# Patient Record
Sex: FEMALE | Race: WHITE | NOT HISPANIC OR LATINO
[De-identification: names, ages, dates, MRNs, and addresses within clinical notes are randomized per-mention and may not be internally consistent; named-entity substitution may affect disease eponyms.]

---

## 2019-11-18 ENCOUNTER — HOSPITAL (OUTPATIENT)
Dept: OTHER | Age: 79
End: 2019-11-18

## 2019-11-18 ENCOUNTER — DIAGNOSTIC TRANS (OUTPATIENT)
Dept: OTHER | Age: 79
End: 2019-11-18

## 2019-11-18 LAB
ALBUMIN SERPL-MCNC: 3.8 G/DL (ref 3.6–5.1)
ALBUMIN/GLOB SERPL: 0.9 {RATIO} (ref 1–2.4)
ALP SERPL-CCNC: 74 UNITS/L (ref 45–117)
ALT SERPL-CCNC: 15 UNITS/L
AMORPH SED URNS QL MICRO: ABNORMAL
ANALYZER ANC (IANC): ABNORMAL
ANION GAP SERPL CALC-SCNC: 16 MMOL/L (ref 10–20)
APPEARANCE UR: CLEAR
AST SERPL-CCNC: 16 UNITS/L
BACTERIA #/AREA URNS HPF: ABNORMAL /HPF
BASOPHILS # BLD: 0.1 K/MCL (ref 0–0.3)
BASOPHILS NFR BLD: 0 %
BILIRUB SERPL-MCNC: 0.5 MG/DL (ref 0.2–1)
BILIRUB UR QL: NEGATIVE
BUN SERPL-MCNC: 14 MG/DL (ref 6–20)
BUN/CREAT SERPL: 26 (ref 7–25)
CALCIUM SERPL-MCNC: 9.7 MG/DL (ref 8.4–10.2)
CAOX CRY URNS QL MICRO: ABNORMAL
CHLORIDE SERPL-SCNC: 105 MMOL/L (ref 98–107)
CO2 SERPL-SCNC: 20 MMOL/L (ref 21–32)
COLOR UR: YELLOW
CREAT SERPL-MCNC: 0.54 MG/DL (ref 0.51–0.95)
DIFFERENTIAL METHOD BLD: ABNORMAL
EOSINOPHIL # BLD: 0 K/MCL (ref 0.1–0.5)
EOSINOPHIL NFR BLD: 0 %
EPITH CASTS #/AREA URNS LPF: ABNORMAL /[LPF]
ERYTHROCYTE [DISTWIDTH] IN BLOOD: 19 % (ref 11–15)
FATTY CASTS #/AREA URNS LPF: ABNORMAL /[LPF]
GLOBULIN SER-MCNC: 4.2 G/DL (ref 2–4)
GLUCOSE BLDC GLUCOMTR-MCNC: 156 MG/DL (ref 70–99)
GLUCOSE BLDC GLUCOMTR-MCNC: 159 MG/DL (ref 70–99)
GLUCOSE SERPL-MCNC: 198 MG/DL (ref 65–99)
GLUCOSE UR-MCNC: >500 MG/DL
GRAN CASTS #/AREA URNS LPF: ABNORMAL /[LPF]
HCT VFR BLD CALC: 47 % (ref 36–46.5)
HGB BLD-MCNC: 15.1 G/DL (ref 12–15.5)
HGB UR QL: NEGATIVE
HYALINE CASTS #/AREA URNS LPF: ABNORMAL /LPF (ref 0–5)
IMM GRANULOCYTES # BLD AUTO: 0.1 K/MCL (ref 0–0.2)
IMM GRANULOCYTES NFR BLD: 1 %
KETONES UR-MCNC: 80 MG/DL
LACTATE BLDV-MCNC: 1.5 MMOL/L
LEUKOCYTE ESTERASE UR QL STRIP: NEGATIVE
LIPASE SERPL-CCNC: <50 UNITS/L (ref 73–393)
LYMPHOCYTES # BLD: 1.4 K/MCL (ref 1–4)
LYMPHOCYTES NFR BLD: 9 %
MCH RBC QN AUTO: 25 PG (ref 26–34)
MCHC RBC AUTO-ENTMCNC: 32.1 G/DL (ref 32–36.5)
MCV RBC AUTO: 77.7 FL (ref 78–100)
MIXED CELL CASTS #/AREA URNS LPF: ABNORMAL /[LPF]
MONOCYTES # BLD: 0.9 K/MCL (ref 0.3–0.9)
MONOCYTES NFR BLD: 6 %
MUCOUS THREADS URNS QL MICRO: ABNORMAL
NEUTROPHILS # BLD: 13 K/MCL (ref 1.8–7.7)
NEUTROPHILS NFR BLD: 84 %
NEUTS SEG NFR BLD: ABNORMAL %
NITRITE UR QL: NEGATIVE
NRBC (NRBCRE): 0 /100 WBC
PH UR: 5 UNITS (ref 5–7)
PLATELET # BLD: 375 K/MCL (ref 140–450)
POTASSIUM SERPL-SCNC: 3.5 MMOL/L (ref 3.4–5.1)
PROT SERPL-MCNC: 8 G/DL (ref 6.4–8.2)
PROT UR QL: NEGATIVE MG/DL
RBC # BLD: 6.05 MIL/MCL (ref 4–5.2)
RBC #/AREA URNS HPF: ABNORMAL /HPF (ref 0–2)
RBC CASTS #/AREA URNS LPF: ABNORMAL /[LPF]
RENAL EPI CELLS #/AREA URNS HPF: ABNORMAL /[HPF]
SODIUM SERPL-SCNC: 138 MMOL/L (ref 135–145)
SP GR UR: >1.03 (ref 1–1.03)
SPECIMEN SOURCE: ABNORMAL
SPECIMEN SOURCE: ABNORMAL
SPERM URNS QL MICRO: ABNORMAL
SQUAMOUS #/AREA URNS HPF: ABNORMAL /HPF (ref 0–5)
T VAGINALIS URNS QL MICRO: ABNORMAL
TRI-PHOS CRY URNS QL MICRO: ABNORMAL
TROPONIN I SERPL HS-MCNC: <0.02 NG/ML
URATE CRY URNS QL MICRO: ABNORMAL
URINE REFLEX: ABNORMAL
URNS CMNT MICRO: ABNORMAL
UROBILINOGEN UR QL: 0.2 MG/DL (ref 0–1)
WAXY CASTS #/AREA URNS LPF: ABNORMAL /[LPF]
WBC # BLD: 15.4 K/MCL (ref 4.2–11)
WBC #/AREA URNS HPF: ABNORMAL /HPF (ref 0–5)
WBC CASTS #/AREA URNS LPF: ABNORMAL /[LPF]
YEAST HYPHAE URNS QL MICRO: ABNORMAL
YEAST URNS QL MICRO: ABNORMAL

## 2019-11-18 PROCEDURE — 99223 1ST HOSP IP/OBS HIGH 75: CPT

## 2019-11-18 PROCEDURE — X1094 NO CHARGE VISIT: HCPCS | Performed by: SPECIALIST/TECHNOLOGIST, OTHER

## 2019-11-18 PROCEDURE — 49566 REPAIR RECURR INCIS HERNIA,STRANG: CPT

## 2019-11-18 PROCEDURE — 49568 HERNIA MESH IMPLANT VENT/INCIS FOR OPEN: CPT

## 2019-11-19 LAB
ALBUMIN SERPL-MCNC: 2.8 G/DL (ref 3.6–5.1)
ALBUMIN/GLOB SERPL: 0.8 {RATIO} (ref 1–2.4)
ALP SERPL-CCNC: 50 UNITS/L (ref 45–117)
ALT SERPL-CCNC: 8 UNITS/L
ANALYZER ANC (IANC): ABNORMAL
ANION GAP SERPL CALC-SCNC: 11 MMOL/L (ref 10–20)
AST SERPL-CCNC: 13 UNITS/L
BASOPHILS # BLD: 0 K/MCL (ref 0–0.3)
BASOPHILS NFR BLD: 0 %
BILIRUB SERPL-MCNC: 0.5 MG/DL (ref 0.2–1)
BUN SERPL-MCNC: 16 MG/DL (ref 6–20)
BUN/CREAT SERPL: 26 (ref 7–25)
CALCIUM SERPL-MCNC: 8.1 MG/DL (ref 8.4–10.2)
CHLORIDE SERPL-SCNC: 107 MMOL/L (ref 98–107)
CO2 SERPL-SCNC: 25 MMOL/L (ref 21–32)
CREAT SERPL-MCNC: 0.62 MG/DL (ref 0.51–0.95)
DIFFERENTIAL METHOD BLD: ABNORMAL
EOSINOPHIL # BLD: 0 K/MCL (ref 0.1–0.5)
EOSINOPHIL NFR BLD: 0 %
ERYTHROCYTE [DISTWIDTH] IN BLOOD: 18.1 % (ref 11–15)
GLOBULIN SER-MCNC: 3.4 G/DL (ref 2–4)
GLUCOSE BLDC GLUCOMTR-MCNC: 188 MG/DL (ref 70–99)
GLUCOSE BLDC GLUCOMTR-MCNC: 195 MG/DL (ref 70–99)
GLUCOSE BLDC GLUCOMTR-MCNC: 195 MG/DL (ref 70–99)
GLUCOSE BLDC GLUCOMTR-MCNC: 206 MG/DL (ref 70–99)
GLUCOSE BLDC GLUCOMTR-MCNC: ABNORMAL MG/DL
GLUCOSE SERPL-MCNC: 209 MG/DL (ref 65–99)
HCT VFR BLD CALC: 41.3 % (ref 36–46.5)
HGB BLD-MCNC: 13 G/DL (ref 12–15.5)
IMM GRANULOCYTES # BLD AUTO: 0.1 K/MCL (ref 0–0.2)
IMM GRANULOCYTES NFR BLD: 0 %
LYMPHOCYTES # BLD: 2 K/MCL (ref 1–4)
LYMPHOCYTES NFR BLD: 15 %
MCH RBC QN AUTO: 24.6 PG (ref 26–34)
MCHC RBC AUTO-ENTMCNC: 31.5 G/DL (ref 32–36.5)
MCV RBC AUTO: 78.1 FL (ref 78–100)
MONOCYTES # BLD: 2.9 K/MCL (ref 0.3–0.9)
MONOCYTES NFR BLD: 21 %
NEUTROPHILS # BLD: 8.7 K/MCL (ref 1.8–7.7)
NEUTROPHILS NFR BLD: 64 %
NEUTS SEG NFR BLD: ABNORMAL %
NRBC (NRBCRE): 0 /100 WBC
PLATELET # BLD: 334 K/MCL (ref 140–450)
POTASSIUM SERPL-SCNC: 3.3 MMOL/L (ref 3.4–5.1)
PROT SERPL-MCNC: 6.2 G/DL (ref 6.4–8.2)
RBC # BLD: 5.29 MIL/MCL (ref 4–5.2)
SODIUM SERPL-SCNC: 140 MMOL/L (ref 135–145)
WBC # BLD: 13.8 K/MCL (ref 4.2–11)

## 2019-11-19 PROCEDURE — 99024 POSTOP FOLLOW-UP VISIT: CPT | Performed by: NURSE PRACTITIONER

## 2019-11-20 LAB
ANALYZER ANC (IANC): ABNORMAL
ANION GAP SERPL CALC-SCNC: 11 MMOL/L (ref 10–20)
BUN SERPL-MCNC: 15 MG/DL (ref 6–20)
BUN/CREAT SERPL: 28 (ref 7–25)
CALCIUM SERPL-MCNC: 8.2 MG/DL (ref 8.4–10.2)
CHLORIDE SERPL-SCNC: 106 MMOL/L (ref 98–107)
CO2 SERPL-SCNC: 25 MMOL/L (ref 21–32)
CREAT SERPL-MCNC: 0.51 MG/DL (ref 0.51–0.95)
CREAT SERPL-MCNC: 0.54 MG/DL (ref 0.51–0.95)
ERYTHROCYTE [DISTWIDTH] IN BLOOD: 17.3 % (ref 11–15)
GLUCOSE BLDC GLUCOMTR-MCNC: 131 MG/DL (ref 70–99)
GLUCOSE BLDC GLUCOMTR-MCNC: 158 MG/DL (ref 70–99)
GLUCOSE BLDC GLUCOMTR-MCNC: 165 MG/DL (ref 70–99)
GLUCOSE BLDC GLUCOMTR-MCNC: 165 MG/DL (ref 70–99)
GLUCOSE BLDC GLUCOMTR-MCNC: 171 MG/DL (ref 70–99)
GLUCOSE BLDC GLUCOMTR-MCNC: ABNORMAL MG/DL
GLUCOSE SERPL-MCNC: 181 MG/DL (ref 65–99)
HCT VFR BLD CALC: 37.8 % (ref 36–46.5)
HGB BLD-MCNC: 12.1 G/DL (ref 12–15.5)
MCH RBC QN AUTO: 25.5 PG (ref 26–34)
MCHC RBC AUTO-ENTMCNC: 32 G/DL (ref 32–36.5)
MCV RBC AUTO: 79.6 FL (ref 78–100)
NRBC (NRBCRE): 0 /100 WBC
PLATELET # BLD: 323 K/MCL (ref 140–450)
POTASSIUM SERPL-SCNC: 4.1 MMOL/L (ref 3.4–5.1)
POTASSIUM SERPL-SCNC: 4.1 MMOL/L (ref 3.4–5.1)
POTASSIUM SERPL-SCNC: ABNORMAL MMOL/L
RBC # BLD: 4.75 MIL/MCL (ref 4–5.2)
SODIUM SERPL-SCNC: 138 MMOL/L (ref 135–145)
WBC # BLD: 12.8 K/MCL (ref 4.2–11)

## 2019-11-20 PROCEDURE — 99024 POSTOP FOLLOW-UP VISIT: CPT

## 2019-11-21 LAB
ANALYZER ANC (IANC): ABNORMAL
BASOPHILS # BLD: 0 K/MCL (ref 0–0.3)
BASOPHILS NFR BLD: 0 %
CREAT SERPL-MCNC: 0.52 MG/DL (ref 0.51–0.95)
DIFFERENTIAL METHOD BLD: ABNORMAL
EOSINOPHIL # BLD: 0.5 K/MCL (ref 0.1–0.5)
EOSINOPHIL NFR BLD: 4 %
ERYTHROCYTE [DISTWIDTH] IN BLOOD: 17.2 % (ref 11–15)
GLUCOSE BLDC GLUCOMTR-MCNC: 146 MG/DL (ref 70–99)
GLUCOSE BLDC GLUCOMTR-MCNC: 165 MG/DL (ref 70–99)
GLUCOSE BLDC GLUCOMTR-MCNC: 168 MG/DL (ref 70–99)
GLUCOSE BLDC GLUCOMTR-MCNC: 174 MG/DL (ref 70–99)
GLUCOSE BLDC GLUCOMTR-MCNC: 256 MG/DL (ref 70–99)
GLUCOSE BLDC GLUCOMTR-MCNC: ABNORMAL MG/DL
HCT VFR BLD CALC: 38.7 % (ref 36–46.5)
HGB BLD-MCNC: 12.4 G/DL (ref 12–15.5)
IMM GRANULOCYTES # BLD AUTO: 0.1 K/MCL (ref 0–0.2)
IMM GRANULOCYTES NFR BLD: 1 %
LYMPHOCYTES # BLD: 3 K/MCL (ref 1–4)
LYMPHOCYTES NFR BLD: 25 %
MAGNESIUM SERPL-MCNC: 1.8 MG/DL (ref 1.7–2.4)
MCH RBC QN AUTO: 24.7 PG (ref 26–34)
MCHC RBC AUTO-ENTMCNC: 32 G/DL (ref 32–36.5)
MCV RBC AUTO: 76.9 FL (ref 78–100)
MONOCYTES # BLD: 1.8 K/MCL (ref 0.3–0.9)
MONOCYTES NFR BLD: 15 %
NEUTROPHILS # BLD: 6.7 K/MCL (ref 1.8–7.7)
NEUTROPHILS NFR BLD: 55 %
NEUTS SEG NFR BLD: ABNORMAL %
NRBC (NRBCRE): 0 /100 WBC
PLATELET # BLD: 347 K/MCL (ref 140–450)
POTASSIUM SERPL-SCNC: 3.5 MMOL/L (ref 3.4–5.1)
RBC # BLD: 5.03 MIL/MCL (ref 4–5.2)
WBC # BLD: 12.1 K/MCL (ref 4.2–11)

## 2019-11-21 PROCEDURE — 99024 POSTOP FOLLOW-UP VISIT: CPT | Performed by: NURSE PRACTITIONER

## 2019-11-22 ENCOUNTER — DIAGNOSTIC TRANS (OUTPATIENT)
Dept: OTHER | Age: 79
End: 2019-11-22

## 2019-11-22 LAB
ANALYZER ANC (IANC): ABNORMAL
BASOPHILS # BLD: 0.1 K/MCL (ref 0–0.3)
BASOPHILS NFR BLD: 1 %
CREAT SERPL-MCNC: 0.49 MG/DL (ref 0.51–0.95)
DIFFERENTIAL METHOD BLD: ABNORMAL
EOSINOPHIL # BLD: 0.6 K/MCL (ref 0.1–0.5)
EOSINOPHIL NFR BLD: 6 %
ERYTHROCYTE [DISTWIDTH] IN BLOOD: 17.1 % (ref 11–15)
GLUCOSE BLDC GLUCOMTR-MCNC: 177 MG/DL (ref 70–99)
GLUCOSE BLDC GLUCOMTR-MCNC: 216 MG/DL (ref 70–99)
GLUCOSE BLDC GLUCOMTR-MCNC: 250 MG/DL (ref 70–99)
GLUCOSE BLDC GLUCOMTR-MCNC: 254 MG/DL (ref 70–99)
GLUCOSE BLDC GLUCOMTR-MCNC: 312 MG/DL (ref 70–99)
GLUCOSE BLDC GLUCOMTR-MCNC: ABNORMAL MG/DL
HCT VFR BLD CALC: 36.5 % (ref 36–46.5)
HGB BLD-MCNC: 11.9 G/DL (ref 12–15.5)
IMM GRANULOCYTES # BLD AUTO: 0.1 K/MCL (ref 0–0.2)
IMM GRANULOCYTES NFR BLD: 1 %
LACTATE BLDV-SCNC: 1 MMOL/L (ref 0–2)
LYMPHOCYTES # BLD: 3.5 K/MCL (ref 1–4)
LYMPHOCYTES NFR BLD: 31 %
MCH RBC QN AUTO: 25.3 PG (ref 26–34)
MCHC RBC AUTO-ENTMCNC: 32.6 G/DL (ref 32–36.5)
MCV RBC AUTO: 77.5 FL (ref 78–100)
MONOCYTES # BLD: 1.9 K/MCL (ref 0.3–0.9)
MONOCYTES NFR BLD: 17 %
NEUTROPHILS # BLD: 4.9 K/MCL (ref 1.8–7.7)
NEUTROPHILS NFR BLD: 44 %
NEUTS SEG NFR BLD: ABNORMAL %
NRBC (NRBCRE): 0 /100 WBC
PLATELET # BLD: 351 K/MCL (ref 140–450)
POTASSIUM SERPL-SCNC: 3.9 MMOL/L (ref 3.4–5.1)
RBC # BLD: 4.71 MIL/MCL (ref 4–5.2)
WBC # BLD: 11.1 K/MCL (ref 4.2–11)

## 2019-11-22 PROCEDURE — 99024 POSTOP FOLLOW-UP VISIT: CPT | Performed by: SURGERY

## 2019-11-22 PROCEDURE — 93306 TTE W/DOPPLER COMPLETE: CPT | Performed by: INTERNAL MEDICINE

## 2019-11-23 LAB
ALBUMIN SERPL-MCNC: 2.4 G/DL (ref 3.6–5.1)
ALBUMIN/GLOB SERPL: 0.6 {RATIO} (ref 1–2.4)
ALP SERPL-CCNC: 71 UNITS/L (ref 45–117)
ALT SERPL-CCNC: 15 UNITS/L
ANALYZER ANC (IANC): ABNORMAL
ANION GAP SERPL CALC-SCNC: 7 MMOL/L (ref 10–20)
AST SERPL-CCNC: 14 UNITS/L
BASOPHILS # BLD: 0.1 K/MCL (ref 0–0.3)
BASOPHILS NFR BLD: 1 %
BILIRUB SERPL-MCNC: 0.4 MG/DL (ref 0.2–1)
BUN SERPL-MCNC: 12 MG/DL (ref 6–20)
BUN/CREAT SERPL: 24 (ref 7–25)
CALCIUM SERPL-MCNC: 9 MG/DL (ref 8.4–10.2)
CHLORIDE SERPL-SCNC: 101 MMOL/L (ref 98–107)
CO2 SERPL-SCNC: 35 MMOL/L (ref 21–32)
CREAT SERPL-MCNC: 0.5 MG/DL (ref 0.51–0.95)
DIFFERENTIAL METHOD BLD: ABNORMAL
EOSINOPHIL # BLD: 0.6 K/MCL (ref 0.1–0.5)
EOSINOPHIL NFR BLD: 6 %
ERYTHROCYTE [DISTWIDTH] IN BLOOD: 16.8 % (ref 11–15)
GLOBULIN SER-MCNC: 3.7 G/DL (ref 2–4)
GLUCOSE BLDC GLUCOMTR-MCNC: 222 MG/DL (ref 70–99)
GLUCOSE BLDC GLUCOMTR-MCNC: 265 MG/DL (ref 70–99)
GLUCOSE BLDC GLUCOMTR-MCNC: 298 MG/DL (ref 70–99)
GLUCOSE BLDC GLUCOMTR-MCNC: 319 MG/DL (ref 70–99)
GLUCOSE BLDC GLUCOMTR-MCNC: 369 MG/DL (ref 70–99)
GLUCOSE BLDC GLUCOMTR-MCNC: ABNORMAL MG/DL
GLUCOSE SERPL-MCNC: 261 MG/DL (ref 65–99)
HCT VFR BLD CALC: 38.7 % (ref 36–46.5)
HGB BLD-MCNC: 12.4 G/DL (ref 12–15.5)
IMM GRANULOCYTES # BLD AUTO: 0.1 K/MCL (ref 0–0.2)
IMM GRANULOCYTES NFR BLD: 1 %
LYMPHOCYTES # BLD: 2.5 K/MCL (ref 1–4)
LYMPHOCYTES NFR BLD: 27 %
MCH RBC QN AUTO: 25.3 PG (ref 26–34)
MCHC RBC AUTO-ENTMCNC: 32 G/DL (ref 32–36.5)
MCV RBC AUTO: 78.8 FL (ref 78–100)
MONOCYTES # BLD: 1.8 K/MCL (ref 0.3–0.9)
MONOCYTES NFR BLD: 20 %
NEUTROPHILS # BLD: 4.2 K/MCL (ref 1.8–7.7)
NEUTROPHILS NFR BLD: 45 %
NEUTS SEG NFR BLD: ABNORMAL %
NRBC (NRBCRE): 0 /100 WBC
PLATELET # BLD: 345 K/MCL (ref 140–450)
POTASSIUM SERPL-SCNC: 3.6 MMOL/L (ref 3.4–5.1)
PROT SERPL-MCNC: 6.1 G/DL (ref 6.4–8.2)
RBC # BLD: 4.91 MIL/MCL (ref 4–5.2)
SODIUM SERPL-SCNC: 139 MMOL/L (ref 135–145)
WBC # BLD: 9.2 K/MCL (ref 4.2–11)

## 2019-11-24 LAB
ANALYZER ANC (IANC): ABNORMAL
ANION GAP SERPL CALC-SCNC: 8 MMOL/L (ref 10–20)
BASOPHILS # BLD: 0.1 K/MCL (ref 0–0.3)
BASOPHILS NFR BLD: 1 %
BUN SERPL-MCNC: 12 MG/DL (ref 6–20)
BUN/CREAT SERPL: 25 (ref 7–25)
CALCIUM SERPL-MCNC: 8.6 MG/DL (ref 8.4–10.2)
CHLORIDE SERPL-SCNC: 98 MMOL/L (ref 98–107)
CO2 SERPL-SCNC: 34 MMOL/L (ref 21–32)
CREAT SERPL-MCNC: 0.47 MG/DL (ref 0.51–0.95)
DIFFERENTIAL METHOD BLD: ABNORMAL
EOSINOPHIL # BLD: 0.4 K/MCL (ref 0.1–0.5)
EOSINOPHIL NFR BLD: 4 %
ERYTHROCYTE [DISTWIDTH] IN BLOOD: 16.2 % (ref 11–15)
GLUCOSE BLDC GLUCOMTR-MCNC: 204 MG/DL (ref 70–99)
GLUCOSE BLDC GLUCOMTR-MCNC: 231 MG/DL (ref 70–99)
GLUCOSE BLDC GLUCOMTR-MCNC: 238 MG/DL (ref 70–99)
GLUCOSE BLDC GLUCOMTR-MCNC: 302 MG/DL (ref 70–99)
GLUCOSE BLDC GLUCOMTR-MCNC: ABNORMAL MG/DL
GLUCOSE SERPL-MCNC: 235 MG/DL (ref 65–99)
HCT VFR BLD CALC: 37.7 % (ref 36–46.5)
HGB BLD-MCNC: 12.2 G/DL (ref 12–15.5)
IMM GRANULOCYTES # BLD AUTO: 0.1 K/MCL (ref 0–0.2)
IMM GRANULOCYTES NFR BLD: 1 %
LYMPHOCYTES # BLD: 3.4 K/MCL (ref 1–4)
LYMPHOCYTES NFR BLD: 31 %
MCH RBC QN AUTO: 25.4 PG (ref 26–34)
MCHC RBC AUTO-ENTMCNC: 32.4 G/DL (ref 32–36.5)
MCV RBC AUTO: 78.4 FL (ref 78–100)
MONOCYTES # BLD: 2 K/MCL (ref 0.3–0.9)
MONOCYTES NFR BLD: 19 %
NEUTROPHILS # BLD: 4.9 K/MCL (ref 1.8–7.7)
NEUTROPHILS NFR BLD: 44 %
NEUTS SEG NFR BLD: ABNORMAL %
NRBC (NRBCRE): 0 /100 WBC
PLATELET # BLD: 385 K/MCL (ref 140–450)
POTASSIUM SERPL-SCNC: 3.2 MMOL/L (ref 3.4–5.1)
RBC # BLD: 4.81 MIL/MCL (ref 4–5.2)
SODIUM SERPL-SCNC: 137 MMOL/L (ref 135–145)
WBC # BLD: 10.9 K/MCL (ref 4.2–11)

## 2019-11-26 LAB
PATHOLOGIST NAME: NORMAL
PATHOLOGIST NAME: NORMAL

## 2019-11-27 LAB
BACTERIA BLD CULT: NORMAL

## 2019-12-03 ENCOUNTER — OFFICE VISIT (OUTPATIENT)
Dept: SURGERY | Age: 79
End: 2019-12-03

## 2019-12-03 VITALS
DIASTOLIC BLOOD PRESSURE: 77 MMHG | WEIGHT: 216 LBS | SYSTOLIC BLOOD PRESSURE: 130 MMHG | HEIGHT: 65 IN | HEART RATE: 78 BPM | BODY MASS INDEX: 35.99 KG/M2

## 2019-12-03 DIAGNOSIS — Z98.890 S/P EXPLORATORY LAPAROTOMY: Primary | ICD-10-CM

## 2019-12-03 PROBLEM — K43.6 VENTRAL HERNIA WITH BOWEL OBSTRUCTION: Status: ACTIVE | Noted: 2019-11-18

## 2019-12-03 PROCEDURE — 99024 POSTOP FOLLOW-UP VISIT: CPT

## 2019-12-03 RX ORDER — SIMVASTATIN 40 MG
TABLET ORAL
Refills: 1 | COMMUNITY
Start: 2019-11-05

## 2019-12-03 RX ORDER — ENOXAPARIN SODIUM 100 MG/ML
40 INJECTION SUBCUTANEOUS
COMMUNITY

## 2019-12-03 RX ORDER — PANTOPRAZOLE SODIUM 40 MG/1
40 TABLET, DELAYED RELEASE ORAL
COMMUNITY

## 2019-12-03 RX ORDER — INSULIN DEGLUDEC 200 U/ML
INJECTION, SOLUTION SUBCUTANEOUS
Refills: 3 | COMMUNITY
Start: 2019-09-11

## 2019-12-03 RX ORDER — ASPIRIN 81 MG/1
81 TABLET, CHEWABLE ORAL
COMMUNITY

## 2019-12-03 RX ORDER — LOSARTAN POTASSIUM 100 MG/1
100 TABLET ORAL
COMMUNITY

## 2019-12-03 RX ORDER — SITAGLIPTIN AND METFORMIN HYDROCHLORIDE 1000; 50 MG/1; MG/1
TABLET, FILM COATED ORAL
Refills: 0 | COMMUNITY
Start: 2019-11-24

## 2019-12-03 RX ORDER — DAPAGLIFLOZIN 10 MG/1
TABLET, FILM COATED ORAL
Refills: 1 | COMMUNITY
Start: 2019-09-10

## 2019-12-03 RX ORDER — SIMVASTATIN 40 MG
TABLET ORAL
COMMUNITY
Start: 2019-05-09

## 2019-12-03 RX ORDER — PIOGLITAZONEHYDROCHLORIDE 30 MG/1
TABLET ORAL
COMMUNITY
Start: 2019-02-25

## 2019-12-03 SDOH — HEALTH STABILITY: MENTAL HEALTH: HOW OFTEN DO YOU HAVE A DRINK CONTAINING ALCOHOL?: NEVER

## 2020-01-07 ENCOUNTER — DIAGNOSTIC TRANS (OUTPATIENT)
Dept: OTHER | Age: 80
End: 2020-01-07

## 2020-01-07 PROCEDURE — 99024 POSTOP FOLLOW-UP VISIT: CPT | Performed by: SURGERY

## 2020-01-08 ENCOUNTER — HOSPITAL (OUTPATIENT)
Dept: OTHER | Age: 80
End: 2020-01-08

## 2020-01-08 PROCEDURE — 99024 POSTOP FOLLOW-UP VISIT: CPT | Performed by: SURGERY

## 2020-10-03 ENCOUNTER — APPOINTMENT (OUTPATIENT)
Dept: GENERAL RADIOLOGY | Facility: HOSPITAL | Age: 80
End: 2020-10-03
Attending: EMERGENCY MEDICINE
Payer: MEDICARE

## 2020-10-03 ENCOUNTER — HOSPITAL ENCOUNTER (OUTPATIENT)
Facility: HOSPITAL | Age: 80
Setting detail: OBSERVATION
Discharge: HOME | End: 2020-10-05
Attending: EMERGENCY MEDICINE | Admitting: INTERNAL MEDICINE
Payer: MEDICARE

## 2020-10-03 DIAGNOSIS — R07.9 CHEST PAIN, UNSPECIFIED TYPE: Primary | ICD-10-CM

## 2020-10-03 PROBLEM — R73.9 HYPERGLYCEMIA: Status: ACTIVE | Noted: 2020-10-03

## 2020-10-03 PROBLEM — R79.89 AZOTEMIA: Status: ACTIVE | Noted: 2020-10-03

## 2020-10-03 PROCEDURE — 80048 BASIC METABOLIC PNL TOTAL CA: CPT | Performed by: EMERGENCY MEDICINE

## 2020-10-03 PROCEDURE — 96372 THER/PROPH/DIAG INJ SC/IM: CPT

## 2020-10-03 PROCEDURE — 36415 COLL VENOUS BLD VENIPUNCTURE: CPT

## 2020-10-03 PROCEDURE — 85379 FIBRIN DEGRADATION QUANT: CPT | Performed by: EMERGENCY MEDICINE

## 2020-10-03 PROCEDURE — 83036 HEMOGLOBIN GLYCOSYLATED A1C: CPT | Performed by: INTERNAL MEDICINE

## 2020-10-03 PROCEDURE — 81003 URINALYSIS AUTO W/O SCOPE: CPT | Performed by: EMERGENCY MEDICINE

## 2020-10-03 PROCEDURE — 84484 ASSAY OF TROPONIN QUANT: CPT | Performed by: EMERGENCY MEDICINE

## 2020-10-03 PROCEDURE — 82962 GLUCOSE BLOOD TEST: CPT

## 2020-10-03 PROCEDURE — 93010 ELECTROCARDIOGRAM REPORT: CPT | Performed by: EMERGENCY MEDICINE

## 2020-10-03 PROCEDURE — 93005 ELECTROCARDIOGRAM TRACING: CPT

## 2020-10-03 PROCEDURE — 71045 X-RAY EXAM CHEST 1 VIEW: CPT | Performed by: EMERGENCY MEDICINE

## 2020-10-03 PROCEDURE — 99285 EMERGENCY DEPT VISIT HI MDM: CPT

## 2020-10-03 PROCEDURE — 85060 BLOOD SMEAR INTERPRETATION: CPT | Performed by: EMERGENCY MEDICINE

## 2020-10-03 PROCEDURE — 85025 COMPLETE CBC W/AUTO DIFF WBC: CPT | Performed by: EMERGENCY MEDICINE

## 2020-10-03 RX ORDER — ACETAMINOPHEN 325 MG/1
650 TABLET ORAL EVERY 6 HOURS PRN
Status: DISCONTINUED | OUTPATIENT
Start: 2020-10-03 | End: 2020-10-05

## 2020-10-03 RX ORDER — SIMVASTATIN 40 MG
40 TABLET ORAL NIGHTLY
COMMUNITY

## 2020-10-03 RX ORDER — DEXTROSE MONOHYDRATE 25 G/50ML
50 INJECTION, SOLUTION INTRAVENOUS
Status: DISCONTINUED | OUTPATIENT
Start: 2020-10-03 | End: 2020-10-05

## 2020-10-03 RX ORDER — ASPIRIN 81 MG/1
TABLET, CHEWABLE ORAL DAILY
COMMUNITY

## 2020-10-03 RX ORDER — ATORVASTATIN CALCIUM 20 MG/1
20 TABLET, FILM COATED ORAL NIGHTLY
Status: DISCONTINUED | OUTPATIENT
Start: 2020-10-03 | End: 2020-10-05

## 2020-10-03 RX ORDER — INSULIN DEGLUDEC INJECTION 100 U/ML
INJECTION, SOLUTION SUBCUTANEOUS
COMMUNITY

## 2020-10-03 RX ORDER — ASPIRIN 81 MG/1
81 TABLET, CHEWABLE ORAL DAILY
Status: DISCONTINUED | OUTPATIENT
Start: 2020-10-04 | End: 2020-10-05

## 2020-10-03 RX ORDER — NITROGLYCERIN 0.4 MG/1
0.4 TABLET SUBLINGUAL EVERY 5 MIN PRN
Status: DISCONTINUED | OUTPATIENT
Start: 2020-10-03 | End: 2020-10-05

## 2020-10-03 RX ORDER — ASPIRIN 81 MG/1
243 TABLET, CHEWABLE ORAL ONCE
Status: COMPLETED | OUTPATIENT
Start: 2020-10-03 | End: 2020-10-03

## 2020-10-03 RX ORDER — SITAGLIPTIN AND METFORMIN HYDROCHLORIDE 50; 1000 MG/1; MG/1
TABLET, FILM COATED, EXTENDED RELEASE ORAL DAILY
COMMUNITY

## 2020-10-03 RX ORDER — PIOGLITAZONEHYDROCHLORIDE 30 MG/1
30 TABLET ORAL DAILY
COMMUNITY

## 2020-10-03 RX ORDER — LOSARTAN POTASSIUM 100 MG/1
TABLET ORAL DAILY
COMMUNITY

## 2020-10-03 RX ORDER — SODIUM CHLORIDE 0.9 % (FLUSH) 0.9 %
3 SYRINGE (ML) INJECTION AS NEEDED
Status: DISCONTINUED | OUTPATIENT
Start: 2020-10-03 | End: 2020-10-05

## 2020-10-03 RX ORDER — PANTOPRAZOLE SODIUM 40 MG/1
40 TABLET, DELAYED RELEASE ORAL
COMMUNITY

## 2020-10-03 RX ORDER — PANTOPRAZOLE SODIUM 40 MG/1
40 TABLET, DELAYED RELEASE ORAL
Status: DISCONTINUED | OUTPATIENT
Start: 2020-10-04 | End: 2020-10-05

## 2020-10-03 RX ORDER — GLIMEPIRIDE 4 MG/1
4 TABLET ORAL
COMMUNITY

## 2020-10-03 RX ORDER — ENOXAPARIN SODIUM 100 MG/ML
40 INJECTION SUBCUTANEOUS DAILY
Status: DISCONTINUED | OUTPATIENT
Start: 2020-10-03 | End: 2020-10-05

## 2020-10-03 RX ORDER — LOSARTAN POTASSIUM 100 MG/1
100 TABLET ORAL DAILY
Status: DISCONTINUED | OUTPATIENT
Start: 2020-10-03 | End: 2020-10-05

## 2020-10-03 NOTE — ED PROVIDER NOTES
Patient Seen in: [facility] AND [facility] Emergency Department      History   Patient presents with:  Chest Pain Angina  Difficulty Breathing    Stated Complaint: chest pain, sob    HPI    Patient presents to the emergency department complaining of chest pain o range of motion and neck supple. Cardiovascular:      Rate and Rhythm: Normal rate and regular rhythm. Heart sounds: No murmur. Pulmonary:      Effort: Pulmonary effort is normal. No respiratory distress. Breath sounds: Normal breath sounds. Report.   Rate: 102 bpm  Rhythm: Sinus Rhythm  Reading: Normal EKG                      MDM      Pulse Ox: 97%, Normal,     Cardiac Monitor: Pulse Readings from Last 1 Encounters:  10/03/20 : 71  , sinus,      Radiology findings: Xr Chest Ap Portable  (cpt=

## 2020-10-03 NOTE — ED INITIAL ASSESSMENT (HPI)
Pt reports chest pain, SOB and back pain. Symptoms present for \"weeks\" but worse today.  Hx asthma, HTN, DM

## 2020-10-04 PROCEDURE — 80048 BASIC METABOLIC PNL TOTAL CA: CPT | Performed by: INTERNAL MEDICINE

## 2020-10-04 PROCEDURE — 96372 THER/PROPH/DIAG INJ SC/IM: CPT

## 2020-10-04 PROCEDURE — 82962 GLUCOSE BLOOD TEST: CPT

## 2020-10-04 PROCEDURE — 85025 COMPLETE CBC W/AUTO DIFF WBC: CPT | Performed by: INTERNAL MEDICINE

## 2020-10-04 RX ORDER — POLYETHYLENE GLYCOL 3350 17 G/17G
17 POWDER, FOR SOLUTION ORAL DAILY PRN
Status: DISCONTINUED | OUTPATIENT
Start: 2020-10-04 | End: 2020-10-05

## 2020-10-04 RX ORDER — DOCUSATE SODIUM 100 MG/1
100 CAPSULE, LIQUID FILLED ORAL 2 TIMES DAILY
Status: DISCONTINUED | OUTPATIENT
Start: 2020-10-04 | End: 2020-10-05

## 2020-10-04 NOTE — ED NOTES
Orders for admission, patient is aware of plan and ready to go upstairs. Any questions, please call ED VALENTINO henry  at extension [phone].    Type of COVID test sent:  COVID Suspicion level: Low/High low    Titratable drug(s) infusing:  Rate:none    LOC at time of

## 2020-10-04 NOTE — PLAN OF CARE
Pt admitted with chest pain and CANTU. Chest pain remains, but is slightly better than when she first came in. VSS on RA.  Plan for stress test today    Problem: Patient Centered Care  Goal: Patient preferences are identified and integrated in the patient's p output  - Evaluate effectiveness of vasoactive medications to optimize hemodynamic stability  - Monitor arterial and/or venous puncture sites for bleeding and/or hematoma  - Assess quality of pulses, skin color and temperature  - Assess for signs of decrea

## 2020-10-04 NOTE — H&P
[facility] Theodosis Patient Status:  Emergency    1940 MRN [id number]   Location 6[facility] Attending Latrice Villalba MD   Hosp Day # 0 PCP Li Nelson denies alcohol use        Review of Systems:   A comprehensive review of systems was negative except for: Night sweats and chills which have been on and off over the past month    Physical Exam:   Vital Signs:  Blood pressure 109/69, pulse 71, temperature conditions:  Hypertension  Hyperlipidemia  Asthma    DVT prophylaxis: Lovenox  Diet: Diabetic diet    Javier Hassan DO  10/3/2020

## 2020-10-04 NOTE — PROGRESS NOTES
[facility]    Progress Note    Chrysoula Theodosis Patient Status:  Observation    1940 MRN [id number]   Location 1 Lionel Mehta Attending Eveline Negron, [facility] Day # 0 PCP Alycia Agarwal <0.045 10/03/2020       Xr Chest Ap Portable  (cpt=71045)    Result Date: 10/3/2020  CONCLUSION:  1. No acute cardiopulmonary disease 2.  Prominent bibasilar markings suggest atelectasis and/or scarring    Dictated by (CST): Josefa Myers MD on 10/03/

## 2020-10-04 NOTE — PLAN OF CARE
Problem: Patient Centered Care  Goal: Patient preferences are identified and integrated in the patient's plan of care  Description: Interventions:  - What would you like us to know as we care for you?   - Provide timely, complete, and accurate informatio and/or hematoma  - Assess quality of pulses, skin color and temperature  - Assess for signs of decreased coronary artery perfusion - ex.  Angina  - Evaluate fluid balance, assess for edema, trend weights  Outcome: Progressing  Goal: Absence of cardiac arrhy

## 2020-10-05 ENCOUNTER — APPOINTMENT (OUTPATIENT)
Dept: NUCLEAR MEDICINE | Facility: HOSPITAL | Age: 80
End: 2020-10-05
Attending: INTERNAL MEDICINE
Payer: MEDICARE

## 2020-10-05 ENCOUNTER — APPOINTMENT (OUTPATIENT)
Dept: CV DIAGNOSTICS | Facility: HOSPITAL | Age: 80
End: 2020-10-05
Attending: INTERNAL MEDICINE
Payer: MEDICARE

## 2020-10-05 VITALS
BODY MASS INDEX: 34.82 KG/M2 | OXYGEN SATURATION: 93 % | WEIGHT: 209 LBS | HEIGHT: 65 IN | SYSTOLIC BLOOD PRESSURE: 120 MMHG | RESPIRATION RATE: 16 BRPM | TEMPERATURE: 98 F | DIASTOLIC BLOOD PRESSURE: 75 MMHG | HEART RATE: 70 BPM

## 2020-10-05 PROCEDURE — 78452 HT MUSCLE IMAGE SPECT MULT: CPT | Performed by: INTERNAL MEDICINE

## 2020-10-05 PROCEDURE — 93306 TTE W/DOPPLER COMPLETE: CPT | Performed by: INTERNAL MEDICINE

## 2020-10-05 PROCEDURE — 93016 CV STRESS TEST SUPVJ ONLY: CPT | Performed by: INTERNAL MEDICINE

## 2020-10-05 PROCEDURE — 82962 GLUCOSE BLOOD TEST: CPT

## 2020-10-05 PROCEDURE — 93018 CV STRESS TEST I&R ONLY: CPT | Performed by: INTERNAL MEDICINE

## 2020-10-05 PROCEDURE — 93017 CV STRESS TEST TRACING ONLY: CPT | Performed by: INTERNAL MEDICINE

## 2020-10-05 NOTE — PLAN OF CARE
Stress test normal. Ambulating throughout hallway by self. VSS. Discharging pt home, pt's daughter is picking her up. Problem: Patient Centered Care  Goal: Patient preferences are identified and integrated in the patient's plan of care  Description:  In Ricardo Coronado, RN  Outcome: Progressing  Goal: Patient/Family Short Term Goal  Description: Patient's Short Term Goal: no further chest pain    Interventions:   - medications  - stress test  - See additional Care Plan goals for specific interventions  10/5/2020 160 status  - Assess for changes in mentation and behavior  - Position to facilitate oxygenation and minimize respiratory effort  - Oxygen supplementation based on oxygen saturation or ABGs  - Provide Smoking Cessation handout, if applicable  - Encourage bron

## 2020-10-05 NOTE — RESPIRATORY THERAPY NOTE
Patient refused cpap therapy. [facility] has educated the patient on the purpose of and need for this therapy as well as potential negative outcomes associated with deferring treatment.  Despite education, patient maintains refusal.pt states,she doesn't use CPAP o

## 2020-10-05 NOTE — PLAN OF CARE
VSS, pt still c/o CANTU & chest \"tightness\"- pt reports tightness is worse when ambulating. Tylenol prn given for pain. Pt able to talk full sentences, does not appear to be SOB or in any distress. Echo completed.  Plan- Stress test @ 11am    Problem: [place] Short Term Goal: no further chest pain    Interventions:   - medications  - stress test  - See additional Care Plan goals for specific interventions  10/5/2020 0902 by Krystle Goetz RN  Outcome: Progressing  10/5/2020 0901 by Krystle Goetz RN  Outcome: P breathe, Incentive Spirometry  - Assess the need for suctioning and perform as needed  - Assess and instruct to report SOB or any respiratory difficulty  - Respiratory Therapy support as indicated  - Manage/alleviate anxiety  - Monitor for signs/symptoms o

## 2020-10-05 NOTE — PLAN OF CARE
VSS on RA. Continues to have CANTU, states she still feels about the same. Denied pain medication.  Plan for lexiscan     Problem: Patient Centered Care  Goal: Patient preferences are identified and integrated in the patient's plan of care  Description: Inter vasoactive medications to optimize hemodynamic stability  - Monitor arterial and/or venous puncture sites for bleeding and/or hematoma  - Assess quality of pulses, skin color and temperature  - Assess for signs of decreased coronary artery perfusion - ex.

## 2020-10-05 NOTE — PROGRESS NOTES
[facility]    Progress Note    Chrysoula Theodosis Patient Status:  Observation    1940 MRN [id number]   Location 1 Lionel Mehta Attending Cleopatra Troy, 160[address] [facility] Day # 0 PCP Marikay Lesches <0.045 10/03/2020       Xr Chest Ap Portable  (cpt=71045)    Result Date: 10/3/2020  CONCLUSION:  1. No acute cardiopulmonary disease 2.  Prominent bibasilar markings suggest atelectasis and/or scarring    Dictated by (CST): Agnes Hensley MD on 10/03/

## 2020-10-26 ENCOUNTER — APPOINTMENT (OUTPATIENT)
Dept: GENERAL RADIOLOGY | Facility: HOSPITAL | Age: 80
End: 2020-10-26
Attending: EMERGENCY MEDICINE
Payer: MEDICARE

## 2020-10-26 ENCOUNTER — HOSPITAL ENCOUNTER (EMERGENCY)
Facility: HOSPITAL | Age: 80
Discharge: HOME | End: 2020-10-26
Attending: EMERGENCY MEDICINE
Payer: MEDICARE

## 2020-10-26 ENCOUNTER — APPOINTMENT (OUTPATIENT)
Dept: CT IMAGING | Facility: HOSPITAL | Age: 80
End: 2020-10-26
Attending: EMERGENCY MEDICINE
Payer: MEDICARE

## 2020-10-26 VITALS
TEMPERATURE: 98 F | RESPIRATION RATE: 16 BRPM | BODY MASS INDEX: 36.65 KG/M2 | WEIGHT: 220 LBS | SYSTOLIC BLOOD PRESSURE: 134 MMHG | OXYGEN SATURATION: 96 % | HEART RATE: 86 BPM | DIASTOLIC BLOOD PRESSURE: 71 MMHG | HEIGHT: 65 IN

## 2020-10-26 DIAGNOSIS — S80.01XA CONTUSION OF RIGHT KNEE, INITIAL ENCOUNTER: ICD-10-CM

## 2020-10-26 DIAGNOSIS — S00.01XA ABRASION OF SCALP, INITIAL ENCOUNTER: ICD-10-CM

## 2020-10-26 DIAGNOSIS — S09.90XA INJURY OF HEAD, INITIAL ENCOUNTER: Primary | ICD-10-CM

## 2020-10-26 PROCEDURE — 73560 X-RAY EXAM OF KNEE 1 OR 2: CPT | Performed by: EMERGENCY MEDICINE

## 2020-10-26 PROCEDURE — 99284 EMERGENCY DEPT VISIT MOD MDM: CPT

## 2020-10-26 PROCEDURE — 70450 CT HEAD/BRAIN W/O DYE: CPT | Performed by: EMERGENCY MEDICINE

## 2020-10-26 NOTE — ED PROVIDER NOTES
Patient Seen in: [facility] AND [facility] Emergency Department      History   Patient presents with:  Fall    Stated Complaint: FALL     HPI    [de-identified] y/o female by EMS vacuuming today when she tripped over the cord and hit her head.   She points to mild right knee and intact distal pulses. Pulmonary/Chest: Effort normal. No respiratory distress. Abdominal: Soft. There is no tenderness. There is no guarding. Musculoskeletal: Normal range of motion. No edema or tenderness.    Neurological: Alert and oriented to effacement of the basal cisterns is appreciated. There is no extra-axial fluid collection. CEREBRUM: No acute intraparenchymal hemorrhage, edema, or cortical sulcal effacement is apparent.  There is no space-occupying lesion, mass effect, or shift of midlin Indications:      CANTU. ------------------------------------------------------------------- Ordering MD: Yordan Latham  Interpreting physician:       Yann Adrian MD Sonographer: Leif Miller  CC: Yordan Latham CC: Yordan Latham  -------------- ejection fraction was 60-65%, by biplane method of disks. Wall motion was normal; there were no regional wall motion abnormalities.  Features are consistent with a pseudonormal left ventricular filling pattern, with concomitant abnormal relaxation and incre 33    %      27 - 45  LV ID, major axis, ES, A4C                5.3   cm     -----------  LV PW thickness, ED               (H)     1.0   cm     0.6 - 0.9  IVS/LV PW ratio, ED                       1.07         -----------  LV end-diastolic volume, 2-p (H)     51    ml/m^2 16 - 34   Mitral valve                              Value        Reference  Mitral E-wave peak velocity               76.5  cm/sec -----------  Mitral A-wave peak velocity               87    cm/sec -----------  Mitral deceleratio injected into the same vein. Gated SPECT imaging was performed shortly afterwards. FINDINGS:  RAW PATIENT DATA: A review of the raw patient data showed it was adequate for interpretation.   WALL MOTION ANALYSIS: The gated SPECT images showed normal wall encounter  Contusion of right knee, initial encounter    Disposition:  Discharge  10/26/2020  4:06 pm    Follow-up:  Allison Hernandez  [address] [phone]    Schedule an appointment as soon as possible for a vi

## 2020-10-26 NOTE — ED INITIAL ASSESSMENT (HPI)
Pt states that she slipped and fell onto her right side today while in her home. Lac right side of head.

## 2021-02-11 NOTE — DISCHARGE SUMMARY
[facility]    Discharge Summary    Stacy Pal Patient Status:  Observation    1940 MRN [id number]   Location 1 Lionel Mehta Attending No att. providers found   Hosp Day # 0 PCP Eulalio Willis Historical    losartan 100 MG Oral Tab  Take by mouth daily. , Historical    glimepiride 4 MG Oral Tab  Take 4 mg by mouth every morning before breakfast., Historical    simvastatin 40 MG Oral Tab  Take 40 mg by mouth nightly., Historical    aspirin 81 MG O

## 2024-03-29 ENCOUNTER — HOSPITAL ENCOUNTER (OUTPATIENT)
Facility: HOSPITAL | Age: 84
Setting detail: OBSERVATION
Discharge: HOME HEALTH | End: 2024-04-02
Attending: EMERGENCY MEDICINE | Admitting: INTERNAL MEDICINE
Payer: MEDICARE

## 2024-03-29 ENCOUNTER — APPOINTMENT (OUTPATIENT)
Dept: CT IMAGING | Facility: HOSPITAL | Age: 84
End: 2024-03-29
Attending: EMERGENCY MEDICINE
Payer: MEDICARE

## 2024-03-29 DIAGNOSIS — K57.92 ACUTE DIVERTICULITIS: ICD-10-CM

## 2024-03-29 DIAGNOSIS — E16.2 HYPOGLYCEMIA: Primary | ICD-10-CM

## 2024-03-29 DIAGNOSIS — R19.7 DIARRHEA, UNSPECIFIED TYPE: ICD-10-CM

## 2024-03-29 LAB
ALBUMIN SERPL-MCNC: 4.3 G/DL (ref 3.2–4.8)
ALP LIVER SERPL-CCNC: 75 U/L
ALT SERPL-CCNC: 16 U/L
ANION GAP SERPL CALC-SCNC: 8 MMOL/L (ref 0–18)
AST SERPL-CCNC: 24 U/L (ref ?–34)
BASOPHILS # BLD AUTO: 0.03 X10(3) UL (ref 0–0.2)
BASOPHILS NFR BLD AUTO: 0.3 %
BILIRUB DIRECT SERPL-MCNC: <0.1 MG/DL (ref ?–0.3)
BILIRUB SERPL-MCNC: 0.3 MG/DL (ref 0.2–1.1)
BILIRUB UR QL: NEGATIVE
BUN BLD-MCNC: 26 MG/DL (ref 9–23)
BUN/CREAT SERPL: 36.1 (ref 10–20)
CALCIUM BLD-MCNC: 9.2 MG/DL (ref 8.7–10.4)
CHLORIDE SERPL-SCNC: 111 MMOL/L (ref 98–112)
CLARITY UR: CLEAR
CO2 SERPL-SCNC: 22 MMOL/L (ref 21–32)
CORTIS SERPL-MCNC: 18.5 UG/DL
CREAT BLD-MCNC: 0.72 MG/DL
DEPRECATED RDW RBC AUTO: 39.8 FL (ref 35.1–46.3)
EGFRCR SERPLBLD CKD-EPI 2021: 82 ML/MIN/1.73M2 (ref 60–?)
EOSINOPHIL # BLD AUTO: 0.15 X10(3) UL (ref 0–0.7)
EOSINOPHIL NFR BLD AUTO: 1.4 %
ERYTHROCYTE [DISTWIDTH] IN BLOOD BY AUTOMATED COUNT: 15.3 % (ref 11–15)
GLUCOSE BLD-MCNC: 83 MG/DL (ref 70–99)
GLUCOSE BLDC GLUCOMTR-MCNC: 155 MG/DL (ref 70–99)
GLUCOSE BLDC GLUCOMTR-MCNC: 92 MG/DL (ref 70–99)
GLUCOSE UR-MCNC: 200 MG/DL
HCT VFR BLD AUTO: 30.8 %
HGB BLD-MCNC: 10.3 G/DL
HGB UR QL STRIP.AUTO: NEGATIVE
HYALINE CASTS #/AREA URNS AUTO: PRESENT /LPF
IMM GRANULOCYTES # BLD AUTO: 0.07 X10(3) UL (ref 0–1)
IMM GRANULOCYTES NFR BLD: 0.7 %
KETONES UR-MCNC: NEGATIVE MG/DL
LEUKOCYTE ESTERASE UR QL STRIP.AUTO: 25
LYMPHOCYTES # BLD AUTO: 2.52 X10(3) UL (ref 1–4)
LYMPHOCYTES NFR BLD AUTO: 24 %
MCH RBC QN AUTO: 24.3 PG (ref 26–34)
MCHC RBC AUTO-ENTMCNC: 33.4 G/DL (ref 31–37)
MCV RBC AUTO: 72.8 FL
MONOCYTES # BLD AUTO: 1.93 X10(3) UL (ref 0.1–1)
MONOCYTES NFR BLD AUTO: 18.3 %
NEUTROPHILS # BLD AUTO: 5.82 X10 (3) UL (ref 1.5–7.7)
NEUTROPHILS # BLD AUTO: 5.82 X10(3) UL (ref 1.5–7.7)
NEUTROPHILS NFR BLD AUTO: 55.3 %
NITRITE UR QL STRIP.AUTO: NEGATIVE
OSMOLALITY SERPL CALC.SUM OF ELEC: 296 MOSM/KG (ref 275–295)
PH UR: 5 [PH] (ref 5–8)
PLATELET # BLD AUTO: 344 10(3)UL (ref 150–450)
POTASSIUM SERPL-SCNC: 3.9 MMOL/L (ref 3.5–5.1)
PROT SERPL-MCNC: 7.1 G/DL (ref 5.7–8.2)
RBC # BLD AUTO: 4.23 X10(6)UL
SODIUM SERPL-SCNC: 141 MMOL/L (ref 136–145)
SP GR UR STRIP: 1.02 (ref 1–1.03)
UROBILINOGEN UR STRIP-ACNC: NORMAL
WBC # BLD AUTO: 10.5 X10(3) UL (ref 4–11)

## 2024-03-29 PROCEDURE — 81001 URINALYSIS AUTO W/SCOPE: CPT | Performed by: EMERGENCY MEDICINE

## 2024-03-29 PROCEDURE — 96360 HYDRATION IV INFUSION INIT: CPT

## 2024-03-29 PROCEDURE — 80076 HEPATIC FUNCTION PANEL: CPT | Performed by: EMERGENCY MEDICINE

## 2024-03-29 PROCEDURE — 99285 EMERGENCY DEPT VISIT HI MDM: CPT

## 2024-03-29 PROCEDURE — 80048 BASIC METABOLIC PNL TOTAL CA: CPT | Performed by: EMERGENCY MEDICINE

## 2024-03-29 PROCEDURE — 96361 HYDRATE IV INFUSION ADD-ON: CPT

## 2024-03-29 PROCEDURE — 74177 CT ABD & PELVIS W/CONTRAST: CPT | Performed by: EMERGENCY MEDICINE

## 2024-03-29 PROCEDURE — 93010 ELECTROCARDIOGRAM REPORT: CPT

## 2024-03-29 PROCEDURE — 82962 GLUCOSE BLOOD TEST: CPT

## 2024-03-29 PROCEDURE — 87086 URINE CULTURE/COLONY COUNT: CPT | Performed by: EMERGENCY MEDICINE

## 2024-03-29 PROCEDURE — 93005 ELECTROCARDIOGRAM TRACING: CPT

## 2024-03-29 PROCEDURE — 82533 TOTAL CORTISOL: CPT | Performed by: EMERGENCY MEDICINE

## 2024-03-29 PROCEDURE — 85025 COMPLETE CBC W/AUTO DIFF WBC: CPT | Performed by: EMERGENCY MEDICINE

## 2024-03-30 PROBLEM — K57.92 ACUTE DIVERTICULITIS: Status: ACTIVE | Noted: 2024-03-30

## 2024-03-30 PROBLEM — E16.2 HYPOGLYCEMIA: Status: ACTIVE | Noted: 2024-03-30

## 2024-03-30 PROBLEM — R19.7 DIARRHEA, UNSPECIFIED TYPE: Status: ACTIVE | Noted: 2024-03-30

## 2024-03-30 LAB
ATRIAL RATE: 96 BPM
C DIFF TOX B STL QL: NEGATIVE
EST. AVERAGE GLUCOSE BLD GHB EST-MCNC: 183 MG/DL (ref 68–126)
GLUCOSE BLDC GLUCOMTR-MCNC: 118 MG/DL (ref 70–99)
GLUCOSE BLDC GLUCOMTR-MCNC: 124 MG/DL (ref 70–99)
GLUCOSE BLDC GLUCOMTR-MCNC: 130 MG/DL (ref 70–99)
GLUCOSE BLDC GLUCOMTR-MCNC: 167 MG/DL (ref 70–99)
GLUCOSE BLDC GLUCOMTR-MCNC: 185 MG/DL (ref 70–99)
GLUCOSE BLDC GLUCOMTR-MCNC: 187 MG/DL (ref 70–99)
GLUCOSE BLDC GLUCOMTR-MCNC: 248 MG/DL (ref 70–99)
GLUCOSE BLDC GLUCOMTR-MCNC: 90 MG/DL (ref 70–99)
HBA1C MFR BLD: 8 % (ref ?–5.7)
P AXIS: 59 DEGREES
P-R INTERVAL: 184 MS
Q-T INTERVAL: 362 MS
QRS DURATION: 90 MS
QTC CALCULATION (BEZET): 457 MS
R AXIS: 65 DEGREES
T AXIS: 43 DEGREES
VENTRICULAR RATE: 96 BPM

## 2024-03-30 PROCEDURE — 96372 THER/PROPH/DIAG INJ SC/IM: CPT

## 2024-03-30 PROCEDURE — 96376 TX/PRO/DX INJ SAME DRUG ADON: CPT

## 2024-03-30 PROCEDURE — 96365 THER/PROPH/DIAG IV INF INIT: CPT

## 2024-03-30 PROCEDURE — 82962 GLUCOSE BLOOD TEST: CPT

## 2024-03-30 PROCEDURE — 97161 PT EVAL LOW COMPLEX 20 MIN: CPT

## 2024-03-30 PROCEDURE — 83036 HEMOGLOBIN GLYCOSYLATED A1C: CPT | Performed by: INTERNAL MEDICINE

## 2024-03-30 PROCEDURE — 97116 GAIT TRAINING THERAPY: CPT

## 2024-03-30 PROCEDURE — 87493 C DIFF AMPLIFIED PROBE: CPT | Performed by: INTERNAL MEDICINE

## 2024-03-30 RX ORDER — NICOTINE POLACRILEX 4 MG
15 LOZENGE BUCCAL
Status: DISCONTINUED | OUTPATIENT
Start: 2024-03-30 | End: 2024-04-02

## 2024-03-30 RX ORDER — EVEROLIMUS 5 MG/1
5 TABLET ORAL SEE ADMIN INSTRUCTIONS
Status: DISCONTINUED | OUTPATIENT
Start: 2024-03-30 | End: 2024-03-30

## 2024-03-30 RX ORDER — HEPARIN SODIUM 5000 [USP'U]/ML
5000 INJECTION, SOLUTION INTRAVENOUS; SUBCUTANEOUS EVERY 8 HOURS SCHEDULED
Status: DISCONTINUED | OUTPATIENT
Start: 2024-03-30 | End: 2024-04-02

## 2024-03-30 RX ORDER — EVEROLIMUS 5 MG/1
5 TABLET ORAL EVERY 24 HOURS
Status: DISCONTINUED | OUTPATIENT
Start: 2024-03-30 | End: 2024-04-02

## 2024-03-30 RX ORDER — PREGABALIN 50 MG/1
100 CAPSULE ORAL 2 TIMES DAILY
Status: DISCONTINUED | OUTPATIENT
Start: 2024-03-30 | End: 2024-04-02

## 2024-03-30 RX ORDER — DEXTROSE MONOHYDRATE 25 G/50ML
50 INJECTION, SOLUTION INTRAVENOUS
Status: DISCONTINUED | OUTPATIENT
Start: 2024-03-30 | End: 2024-04-02

## 2024-03-30 RX ORDER — CIPROFLOXACIN 500 MG/1
500 TABLET, FILM COATED ORAL ONCE
Status: COMPLETED | OUTPATIENT
Start: 2024-03-30 | End: 2024-03-30

## 2024-03-30 RX ORDER — METRONIDAZOLE 500 MG/100ML
500 INJECTION, SOLUTION INTRAVENOUS EVERY 8 HOURS
Status: DISCONTINUED | OUTPATIENT
Start: 2024-03-30 | End: 2024-04-01

## 2024-03-30 RX ORDER — HYDROCODONE BITARTRATE AND ACETAMINOPHEN 10; 325 MG/1; MG/1
1 TABLET ORAL EVERY 8 HOURS PRN
COMMUNITY
End: 2024-04-02

## 2024-03-30 RX ORDER — ATORVASTATIN CALCIUM 20 MG/1
20 TABLET, FILM COATED ORAL NIGHTLY
Status: DISCONTINUED | OUTPATIENT
Start: 2024-03-30 | End: 2024-04-02

## 2024-03-30 RX ORDER — MELATONIN
3 NIGHTLY PRN
Status: DISCONTINUED | OUTPATIENT
Start: 2024-03-30 | End: 2024-04-02

## 2024-03-30 RX ORDER — MELATONIN
5000 DAILY
Status: DISCONTINUED | OUTPATIENT
Start: 2024-03-30 | End: 2024-04-02

## 2024-03-30 RX ORDER — PANTOPRAZOLE SODIUM 40 MG/1
40 TABLET, DELAYED RELEASE ORAL
Status: DISCONTINUED | OUTPATIENT
Start: 2024-03-30 | End: 2024-04-02

## 2024-03-30 RX ORDER — INSULIN DEGLUDEC 100 U/ML
10 INJECTION, SOLUTION SUBCUTANEOUS NIGHTLY
Status: DISCONTINUED | OUTPATIENT
Start: 2024-03-30 | End: 2024-04-02

## 2024-03-30 RX ORDER — EVEROLIMUS 5 MG/1
5 TABLET ORAL SEE ADMIN INSTRUCTIONS
COMMUNITY

## 2024-03-30 RX ORDER — METOCLOPRAMIDE HYDROCHLORIDE 5 MG/ML
10 INJECTION INTRAMUSCULAR; INTRAVENOUS EVERY 8 HOURS PRN
Status: DISCONTINUED | OUTPATIENT
Start: 2024-03-30 | End: 2024-04-02

## 2024-03-30 RX ORDER — LOSARTAN POTASSIUM 100 MG/1
100 TABLET ORAL DAILY
Status: DISCONTINUED | OUTPATIENT
Start: 2024-03-30 | End: 2024-04-02

## 2024-03-30 RX ORDER — METRONIDAZOLE 500 MG/1
500 TABLET ORAL ONCE
Status: COMPLETED | OUTPATIENT
Start: 2024-03-30 | End: 2024-03-30

## 2024-03-30 RX ORDER — ACETAMINOPHEN 500 MG
500 TABLET ORAL EVERY 4 HOURS PRN
Status: DISCONTINUED | OUTPATIENT
Start: 2024-03-30 | End: 2024-04-02

## 2024-03-30 RX ORDER — PREGABALIN 100 MG/1
100 CAPSULE ORAL 2 TIMES DAILY
COMMUNITY

## 2024-03-30 RX ORDER — NICOTINE POLACRILEX 4 MG
30 LOZENGE BUCCAL
Status: DISCONTINUED | OUTPATIENT
Start: 2024-03-30 | End: 2024-04-02

## 2024-03-30 RX ORDER — ONDANSETRON 2 MG/ML
4 INJECTION INTRAMUSCULAR; INTRAVENOUS EVERY 6 HOURS PRN
Status: DISCONTINUED | OUTPATIENT
Start: 2024-03-30 | End: 2024-04-02

## 2024-03-30 NOTE — ED PROVIDER NOTES
[place] Emergency Department Note  Patient: Stacy Pal Age: 84 year old Sex: female      MRN: [id number]  : 1940    Patient Seen in: [facility] Emergency Department    History     Chief Complaint   Patient presents with    Hypoglycemia     Stated Complaint: Hypoglycemia    History obtained from: patient, family members     Very pleasant 84-year-old history of hypertension, hyperlipidemia, asthma, diabetes, history of neuroendocrine tumor, status post Whipple procedure, chronically on oral chemotherapy presenting to the ER with complaints of low blood sugar, fatigue, abdominal pain, diarrhea.  Patient states she lives alone.  She states that she checks her blood sugar 3 times daily and recently had her basal and mealtime insulin decreased by her primary doctor couple of days ago but her blood sugars have been in the 40s to 50s despite her eating and drinking normally.  She does report feeling fatigued and having some generalized lower abdominal discomfort as well as multiple episodes of nonbloody diarrhea.  She states she has had diarrhea before but is not exactly sure how often she gets it.  She denies fevers.  No nausea or vomiting.  No dysuria or flank pain.  No hematuria.  Denies bloody stools.  No recent antibiotics.    Spoke with patient's son at bedside who states that patient lives by herself and is concerned about her ability to take her insulin properly and she has never seen an endocrinologist before.  States that she has been checking her blood sugars about an hour to an hour and a half after she eats and takes her 6 units of basal Tresiba with meals but doesn't adjust it depending on sugar after eating. Has been on 18U of basal Tresiba for the past couple of days after PMD decreased from 24U.  Family are concerned about patient's ability to care for herself, concerned about her repeated hypoglycemia and feel like she may need rehab placement    Review of Systems:  Review of  Systems  Positive for stated complaint: Hypoglycemia. Constitutional and vital signs reviewed. All other systems reviewed and negative except as noted above.    Patient History:  Past Medical History:   Diagnosis Date    Asthma (HCC)     Diabetes (HCC)     Essential hypertension     High blood pressure     High cholesterol     Hyperlipidemia     Sleep apnea        Past Surgical History:   Procedure Laterality Date    KNEE REPLACEMENT SURGERY      REMOVAL GALLBLADDER      REPAIR ING HERNIA,5+Y/O,REDUCIBL      SPLENECTOMY      TOTAL ABDOM HYSTERECTOMY          No family history on file.    Specific Social Determinants of Health:   Social History     Socioeconomic History    Marital status:    Tobacco Use    Smoking status: Never    Smokeless tobacco: Never   Vaping Use    Vaping Use: Never used   Substance and Sexual Activity    Alcohol use: Never    Drug use: Never           PSFH elements reviewed from today and agreed except as otherwise stated in HPI.    Physical Exam     ED Triage Vitals   BP 03/29/24 2147 (!) 151/91   Pulse 03/29/24 2141 106   Resp 03/29/24 2141 22   Temp 03/29/24 2141 99.1 °F (37.3 °C)   Temp src 03/29/24 2141 Temporal   SpO2 03/29/24 2141 95 %   O2 Device 03/29/24 2141 None (Room air)       Current:/88   Pulse 91   Temp 99.1 °F (37.3 °C) (Temporal)   Resp 25   Wt 89.4 kg   SpO2 95%   BMI 32.78 kg/m²         Physical Exam  Vitals and nursing note reviewed.   Constitutional:       General: She is not in acute distress.     Appearance: She is not ill-appearing.   HENT:      Head: Normocephalic and atraumatic.      Right Ear: External ear normal.      Left Ear: External ear normal.      Nose: Nose normal.      Mouth/Throat:      Mouth: Mucous membranes are dry.   Eyes:      Conjunctiva/sclera: Conjunctivae normal.   Cardiovascular:      Rate and Rhythm: Normal rate and regular rhythm.      Heart sounds: No murmur heard.  Pulmonary:      Effort: Pulmonary effort is normal. No  respiratory distress.      Breath sounds: No wheezing or rales.   Abdominal:      General: There is no distension.      Palpations: Abdomen is soft.      Tenderness: There is abdominal tenderness. There is no right CVA tenderness, left CVA tenderness, guarding or rebound.      Comments: Generalized lower abd tenderness, no peritoneal signs    Musculoskeletal:         General: No deformity.      Right lower leg: No edema.      Left lower leg: No edema.   Skin:     General: Skin is warm and dry.      Capillary Refill: Capillary refill takes less than 2 seconds.   Neurological:      General: No focal deficit present.      Mental Status: She is alert.         ED Course   Labs:   Labs Reviewed   BASIC METABOLIC PANEL (8) - Abnormal; Notable for the following components:       Result Value    BUN 26 (*)     BUN/CREA Ratio 36.1 (*)     Calculated Osmolality 296 (*)     All other components within normal limits   URINALYSIS WITH CULTURE REFLEX - Abnormal; Notable for the following components:    Glucose Urine 200 (*)     Protein Urine Trace (*)     Leukocyte Esterase Urine 25 (*)     Bacteria Urine Rare (*)     Squamous Epi. Cells Few (*)     Hyaline Casts Present (*)     All other components within normal limits   POCT GLUCOSE - Abnormal; Notable for the following components:    POC Glucose  155 (*)     All other components within normal limits   POCT GLUCOSE - Abnormal; Notable for the following components:    POC Glucose  167 (*)     All other components within normal limits   POCT GLUCOSE - Abnormal; Notable for the following components:    POC Glucose  118 (*)     All other components within normal limits   CBC W/ DIFFERENTIAL - Abnormal; Notable for the following components:    HGB 10.3 (*)     HCT 30.8 (*)     MCV 72.8 (*)     MCH 24.3 (*)     RDW 15.3 (*)     Monocyte Absolute 1.93 (*)     All other components within normal limits   HEPATIC FUNCTION PANEL (7) - Normal   POCT GLUCOSE - Normal   CBC WITH DIFFERENTIAL  WITH PLATELET    Narrative:     The following orders were created for panel order CBC With Differential With Platelet.  Procedure                               Abnormality         Status                     ---------                               -----------         ------                     CBC W/ DIFFERENTIAL[[id number]]          Abnormal            Final result                 Please view results for these tests on the individual orders.   SCAN SLIDE   CORTISOL   RAINBOW DRAW LAVENDER   RAINBOW DRAW LIGHT GREEN   URINE CULTURE, ROUTINE     Radiology findings:  I personally reviewed the images.   No results found.    EKG as interpreted by me: nsr rate 96 bpm, qtc 457 ms, t wve inversion lead III nonspecific T wave flattening in lead II and aVF   Cardiac Monitor: Interpreted by me.   Pulse Readings from Last 1 Encounters:   03/30/24 91   , sinus,       MDM   This patient presents with recurrent low blood sugar also c/o abd pain and diarrhea. VSS on arrival to ED, pt with mild lower abd ttp on exam no peritoneal signs, dry oral mucosa. BS per EMS prior to arrival 50. Here initial glucose 96.     Differential diagnoses considered includes, but is not limited to:   Medication side effect  Dehydration  Electrolyte abnormality   Anemia   Adrenal insufficiency   Diverticulitis  Gastroenteritis   UTI     Will obtain the following tests: cbc, cmp, ua, cortisol, serial POCT glucose, CTAP     Will administer the following medications/therapies: IV NS bolus     Please see ED course for my independent review of these tests/imaging results.    Chronic conditions affecting care: IDDM, HTN, HLD, neuroendocrine tumor     Workup and medications considered but not ordered: n/a     Social Determinants of Health that impacted care: n/a       ED Course as of 03/30/24 0125  ------------------------------------------------------------  Time: 03/29 2202  Value: POC GLUCOSE: 92  Comment:  (Reviewed)  ------------------------------------------------------------  Time: 03/29 2226  Value: Glucose: 83  Comment: (Reviewed)  ------------------------------------------------------------  Time: 03/29 2226  Comment: Lifts normal. Cr normal. Slight elevated BUN/Cr ratio.   ------------------------------------------------------------  Time: 03/29 2238  Value: Urinalysis with Culture Reflex(!)  Comment: Ua not c/w uti. Ucx sent.   ------------------------------------------------------------  Time: 03/29 2312  Value: POC GLUCOSE(!): 155  Comment: (Reviewed)  ------------------------------------------------------------  Time: 03/29 2340  Value: Cortisol: 18.5  Comment: Cortisol normal.   ------------------------------------------------------------  Time: 03/30 0053  Value: CT ABDOMEN+PELVIS(CONTRAST ONLY)(CPT=74392)  Comment: IMPRESSION:    Nonspecific findings may reflect gastroenteritis, possibly mild enterocolitis.  Mildly thick-walled stomach.  Nondilated mildly prominent fluid-filled small bowel.  Mixed liquid and formed stool throughout the colon with intermittent areas of wall thickening in the setting of under distention.      There is trace fat haziness along several distal descending colonic diverticuli, without substantial adjacent wall thickening.  Finding is a bit equivocal and could represent very mild acute diverticulitis in the appropriate clinical setting or be related to background GI syndrome.    No perforation, abscess or free air.  Appendix not visualized, suspect surgically absent.  Hysterectomy.    Patchy bilateral peribronchial basilar opacities may be related to aspiration, pneumonia or sequelae of age indeterminate lung injury.    Distal pancreatectomy and splenectomy.  There is a 2.7 cm ill-defined suspected soft tissue lesion interposed between the posterior stomach and left kidney (3/24). This finding is indeterminant, in the absence of prior imaging and history of malignancy.   Recommend comparison to prior imaging and further workup is required if not previously characterized as benign.    Additional nonacute: Cholecystectomy.  No biliary dilation.  No ureter stone or hydronephrosis.  Bladder unremarkable.  Normal caliber aorta.  Advanced spinal degenerative change with endplate erosive and sclerotic changes.  No acute fractures.    ------------------------------------------------------------  Time: 03/30 0125  Comment:   Updated patient and family with all results which essentially show unremarkable creatinine, no leukocytosis, UA not consistent with UTI, hepatic panel normal, cortisol normal.  Repeat blood sugars have been stable here in the ER however family is very concerned as patient lives alone and they cannot stay with her to monitor her and help her with blood sugars and very confused about her insulin regimen given it has been changed multiple times recently.  After shared decision-making plan for observation, endocrinology to see in the morning.  CT is showing some mild diverticulitis will give dose of Cipro Flagyl for now.              Procedures:  Procedures      Disposition and Plan     Clinical Impression:  1. Hypoglycemia    2. Diarrhea, unspecified type    3. Acute diverticulitis        Disposition:  Admit    Medications Prescribed:  Current Discharge Medication List          Hospital Problems       Present on Admission  Date Reviewed: 3/30/2024            ICD-10-CM Noted POA    Hypoglycemia E16.2 3/30/2024 Yes        This note may have been created using voice dictation technology and may include inadvertent errors.      Jackie Wilson,   Attending Physician   Emergency Medicine

## 2024-03-30 NOTE — CM/SW NOTE
SW sent tentative [facility] referrals via aid[employer] under medicaid due to patient's current class listed as observation status at this time. [facility] request sent,    Pt will need PASRR, PT/OT notes, CLR approval, JOAQUIM list/choice and  SW assessment to see if patient and family is agreeable to JOAQUIM    SW/CM to remain available for support and/or discharge planning.     Denae Sanford, MSW, LSW   x [phone]

## 2024-03-30 NOTE — H&P
History and Physical     Stacy Pal Patient Status:  Emergency    1940 MRN [id number]   Location [facility] EMERGENCY DEPARTMENT Attending Jackie Wilson,    Hosp Day # 0 PCP Eulalio Hernandez MD       Chief Complaint: Hypoglycemia    Subjective:    Stacy Pal is a 84 year old female with IDDM, HTN, HLD, sleep apnea, meningioma, CAD, GERD who presented today for evaluation of hypoglycemia and possible placement.  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  EMT was called today due to patient 'feeling off'.  She was found to blood glucose 50, treated with orange juice  She has had recurrent recurrent hypoglycemia, PCP has been managing DM with Tresiba and oral medications. Dose recently decreased. Last A1c 8.3%  She lives alone and family concerned she cannot care for herself/follow directions or taking medications correctly.  On ROS, patient notes LLQ pain, chronic. Also chills and fever every few days treated with tylenol, has intermittent diarrhea attributed to chemo agent. No urinary symptoms.    Labs stable at presentation.  CT abd per ed physician: enterocolitis, possible diverticulitis  She is being admitted for further management of DM,  evaluation and possible placement. Antibiotics started.  Endocrinology has been consulted.      History/Other:      Past Medical History:  Past Medical History:   Diagnosis Date    Asthma (HCC)     Diabetes (HCC)     Essential hypertension     High blood pressure     High cholesterol     Hyperlipidemia     Sleep apnea         Past Surgical History:   Past Surgical History:   Procedure Laterality Date    KNEE REPLACEMENT SURGERY      REMOVAL GALLBLADDER      REPAIR ING HERNIA,5+Y/O,REDUCIBL      SPLENECTOMY      TOTAL ABDOM HYSTERECTOMY         Social History:  reports that she has never smoked. She has never used smokeless tobacco. She reports that she does not drink alcohol  and does not use drugs.    Family History: No family history on file.    Allergies:   Allergies   Allergen Reactions    Penicillins UNKNOWN       Medications:    No current facility-administered medications on file prior to encounter.     Current Outpatient Medications on File Prior to Encounter   Medication Sig Dispense Refill    SITagliptin-metFORMIN HCl ER (JANUMET XR)  MG Oral Tablet 24 Hr Take by mouth daily.      dapagliflozin Propanediol (FARXIGA) 10 MG Oral Tab Take 10 mg by mouth daily.      Pioglitazone HCl 30 MG Oral Tab Take 30 mg by mouth daily.      Pantoprazole Sodium 40 MG Oral Tab EC Take 40 mg by mouth every morning before breakfast.      losartan 100 MG Oral Tab Take by mouth daily.      glimepiride 4 MG Oral Tab Take 4 mg by mouth every morning before breakfast.      simvastatin 40 MG Oral Tab Take 40 mg by mouth nightly.      aspirin 81 MG Oral Chew Tab Chew by mouth daily.      Insulin Degludec (TRESIBA) 100 UNIT/ML Subcutaneous Solution Inject into the skin.      Cholecalciferol 125 MCG (5000 UT) Oral Tab Take 1 tablet by mouth daily.         Review of Systems:   A comprehensive 14 point review of systems was completed.    Pertinent positives and negatives noted in the HPI.    Objective:     /88   Pulse 91   Temp 99.1 °F (37.3 °C) (Temporal)   Resp 25   Wt 197 lb (89.4 kg)   SpO2 95%   BMI 32.78 kg/m²   General: No acute distress.    HEENT: Normocephalic, atraumatic.  Neck: Supple, no JVD.  Respiratory: Normal effort.  CTAB  Cardiovascular: S1, S2 regular.  No murmur.  Normal rate.   Abdomen: Soft, not distended.  LLQ/groin tenderness.  Neurologic: Alert, oriented x 4.  No focal deficits.  Musculoskeletal: No tenderness or deformity.  Extremities: No edema  Psychiatric: Cooperative    Results:      Labs:  Labs Last 24 Hours:   BMP     CBC    Other     Na 141 Cl 111 BUN 26 Glu 83   Hb 10.3   PTT - Procal -   K 3.9 CO2 22.0 Cr 0.72   WBC 10.5 >< .0  INR - CRP -   Renal  Lytes Endo    Hct 30.8   Trop - D dim -   eGFR - Ca 9.2 POC Gluc  167    LFT   pBNP - Lactic -   eGFR AA - PO4 - A1c -   AST 24 APk 75 Prot 7.1  LDL -     Mg - TSH -   ALT 16 T margaret 0.3 Alb 4.3          COVID-19 Lab Results    COVID-19  Lab Results   Component Value Date    COVID19 Not Detected 10/03/2020       Pro-Calcitonin  No results for input(s): \"PCT\" in the last 168 hours.    Cardiac  No results for input(s): \"TROP\", \"PBNP\" in the last 168 hours.    Creatinine Kinase  No results for input(s): \"CK\" in the last 168 hours.    Inflammatory Markers  No results for input(s): \"CRP\", \"REBA\", \"LDH\", \"DDIMER\" in the last 168 hours.    Imaging: Imaging data reviewed in Epic.    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 Acknowledged understanding and agrees to plan. Also discussed with the ED physician.    High MDM  Time spent on this admission - examining patient, obtaining history, reviewing previous medical records, going over test results/imaging and discussing plan of care. More than  50% of the time was spent in counseling and/or coordination of care related to uncontrolled diabetes, abdominal pain.   All questions answered.     Key Norwood MD  3/30/2024

## 2024-03-30 NOTE — ED INITIAL ASSESSMENT (HPI)
Pt arrived via EMS with complaint of \"feeling off\" per report. Per report patient's sugar was 50 and orange juice was administered. Patient states \"everything hurts.\"    Patient's home medications at the bedside.

## 2024-03-30 NOTE — PLAN OF CARE
Problem: Patient Centered Care  Goal: Patient preferences are identified and integrated in the patient's plan of care  Description: Interventions:  - What would you like us to know as we care for you? From home alone. Spoke with daughter in law and son at bedside.  - Provide timely, complete, and accurate information to patient/family  - Incorporate patient and family knowledge, values, beliefs, and cultural backgrounds into the planning and delivery of care  - Encourage patient/family to participate in care and decision-making at the level they choose  - Honor patient and family perspectives and choices  Outcome: Progressing     Problem: Diabetes/Glucose Control  Goal: Glucose maintained within prescribed range  Description: INTERVENTIONS:  - Monitor Blood Glucose as ordered  - Assess for signs and symptoms of hyperglycemia and hypoglycemia  - Administer ordered medications to maintain glucose within target range  - Assess barriers to adequate nutritional intake and initiate nutrition consult as needed  - Instruct patient on self management of diabetes  Outcome: Progressing     Problem: Patient/Family Goals  Goal: Patient/Family Long Term Goal  Description: Patient's Long Term Goal: To discharge from hospital     Interventions:  -  Monitor vital signs   - Monitor appropriate labs   - Monitor blood glucose levels   - Pain management   - Administer medications per order   - Follow MD orders   - Diagnostics per order   - Update/inform patient and family on plan of care   - Discharge planning    - See additional Care Plan goals for specific interventions  Outcome: Progressing  Goal: Patient/Family Short Term Goal  Description: Patient's Short Term Goal: To stabilize blood glucose level    Interventions:   - Monitor vital signs   - Monitor appropriate labs   - Monitor blood glucose levels   - Pain management   - Administer medications per order   - Follow MD orders   - Diagnostics per order   - Update/inform patient  and family on plan of care   - See additional Care Plan goals for specific interventions  Outcome: Progressing     Problem: PAIN - ADULT  Goal: Verbalizes/displays adequate comfort level or patient's stated pain goal  Description: INTERVENTIONS:  - Encourage pt to monitor pain and request assistance  - Assess pain using appropriate pain scale  - Administer analgesics based on type and severity of pain and evaluate response  - Implement non-pharmacological measures as appropriate and evaluate response  - Consider cultural and social influences on pain and pain management  - Manage/alleviate anxiety  - Utilize distraction and/or relaxation techniques  - Monitor for opioid side effects  - Notify MD/LIP if interventions unsuccessful or patient reports new pain  - Anticipate increased pain with activity and pre-medicate as appropriate  Outcome: Progressing     Problem: RISK FOR INFECTION - ADULT  Goal: Absence of fever/infection during anticipated neutropenic period  Description: INTERVENTIONS  - Monitor WBC  - Administer growth factors as ordered  - Implement neutropenic guidelines  Outcome: Progressing     Problem: SAFETY ADULT - FALL  Goal: Free from fall injury  Description: INTERVENTIONS:  - Assess pt frequently for physical needs  - Identify cognitive and physical deficits and behaviors that affect risk of falls.  - [place] fall precautions as indicated by assessment.  - Educate pt/family on patient safety including physical limitations  - Instruct pt to call for assistance with activity based on assessment  - Modify environment to reduce risk of injury  - Provide assistive devices as appropriate  - Consider OT/PT consult to assist with strengthening/mobility  - Encourage toileting schedule  Outcome: Progressing     Problem: DISCHARGE PLANNING  Goal: Discharge to home or other facility with appropriate resources  Description: INTERVENTIONS:  - Identify barriers to discharge w/pt and caregiver  - Include  patient/family/discharge partner in discharge planning  - Arrange for needed discharge resources and transportation as appropriate  - Identify discharge learning needs (meds, wound care, etc)  - Arrange for interpreters to assist at discharge as needed  - Consider post-discharge preferences of patient/family/discharge partner  - Complete POLST form as appropriate  - Assess patient's ability to be responsible for managing their own health  - Refer to Case Management Department for coordinating discharge planning if the patient needs post-hospital services based on physician/LIP order or complex needs related to functional status, cognitive ability or social support system  Outcome: Progressing     Monitoring vital signs, stable at this time. No acute changes at this moment. Monitoring blood glucose levels. Fall precautions in place- bed alarm on, bed locked in lowest position, call light within reach. Frequent rounding by nursing staff.

## 2024-03-30 NOTE — PHYSICAL THERAPY NOTE
PHYSICAL THERAPY EVALUATION - INPATIENT     Room Number: 547B/547-B  Evaluation Date: 3/30/2024  Type of Evaluation: Initial   Physician Order: PT Eval and Treat    Presenting Problem: admitted via EMS for evaluation of hypoglycemia  Co-Morbidities : DM/ insulin dependent, HTN, HLD, sleep apnea, meningioma, CAD, GERD, neuroendocrine tumor s/p Whipple's procedure leading to DM  Reason for Therapy: Mobility Dysfunction and Discharge Planning    PHYSICAL THERAPY ASSESSMENT   Patient is a 84 year old female admitted 3/29/2024 from home via EMS for evaluation of hypoglycemia, patient w/ h/o DM/ insulin dependent, HTN, HLD, sleep apnea, meningioma, CAD, GERD, neuroendocrine tumor s/p Whipple's procedure leading to DM. Patient AXOx4 , pleasant and cooperative, denied any pain throughout the session. Patient w/ mild SOB during mobility w/ stable vitals on RA requiring cues for energy management.  Current blood sugar is 124.    Prior to admission, patient's baseline is modified independent w/ walker at home and in community, used triangle walker at home and rollator for community ambulation.  Patient is currently functioning below baseline with transfers and gait.  Patient is requiring contact guard assist as a result of the following impairments: decreased functional strength, decreased muscular endurance, and medical status.  Physical Therapy will continue to follow for duration of hospitalization.    Patient will benefit from continued skilled PT Services to promote return to prior level of function and safety with continuous assistance and gradual rehabilitative therapy .    PLAN  PT Treatment Plan: Bed mobility;Body mechanics;Coordination;Endurance;Energy conservation;Patient education;Gait training;Range of motion;Strengthening;Transfer training;Balance training  Rehab Potential : Good  Frequency (Obs): 3-5x/week    PHYSICAL THERAPY MEDICAL/SOCIAL HISTORY   History related to current admission:      Problem  List  Principal Problem:    Hypoglycemia  Active Problems:    Diarrhea, unspecified type    Acute diverticulitis      HOME SITUATION  Home Situation  Type of Home: Independent living facility (Meals available however patient performed shopping/ cooking independently prior to admission)  Home Layout: One level  Stairs to Enter : 0  Stairs to Bedroom: 0  Lives With: Alone  Drives: Yes  Patient Owned Equipment: Rollator (triangle rolling walker at home, rollator for community.)  Patient Regularly Uses: Reading glasses     Prior Level of [place]: Patient reported living in independent living center, meals available however patient did not like, drove and shop/ cook independently, using triangle walker at home and rollator for community ambulation modified independent. Patient was independent w/ ADLS, managed meds without assistance including administering insulin, denied any h/o fall, supportive adult children.    SUBJECTIVE  \" I felt it when my blood sugar dropped, I saw every thing different.\"    PHYSICAL THERAPY EXAMINATION   OBJECTIVE  Precautions: Bed/chair alarm  Fall Risk: Standard fall risk    WEIGHT BEARING RESTRICTION  Weight Bearing Restriction: None                PAIN ASSESSMENT  Ratin          COGNITION  Overall Cognitive Status:  WFL - within functional limits    RANGE OF MOTION AND STRENGTH ASSESSMENT  BLE gross MT 3/5, light touch sensation intact, WFL.     BALANCE  Static Sitting: Good  Dynamic Sitting: Good  Static Standing: Fair  Dynamic Standing: Fair    ADDITIONAL TESTS                                    NEUROLOGICAL FINDINGS                      ACTIVITY TOLERANCE  Pulse: 95  Heart Rate Source: Monitor              Patient Position: Standing    O2 WALK  Oxygen Therapy  SPO2% Ambulation on Room Air: 93    AM-PAC '6-Clicks' INPATIENT SHORT FORM - BASIC MOBILITY  How much difficulty does the patient currently have...  Patient Difficulty: Turning over in bed (including adjusting bedclothes,  sheets and blankets)?: A Little   Patient Difficulty: Sitting down on and standing up from a chair with arms (e.g., wheelchair, bedside commode, etc.): A Little   Patient Difficulty: Moving from lying on back to sitting on the side of the bed?: A Little   How much help from another person does the patient currently need...   Help from Another: Moving to and from a bed to a chair (including a wheelchair)?: A Little   Help from Another: Need to walk in hospital room?: A Little   Help from Another: Climbing 3-5 steps with a railing?: Total     AM-PAC Score:  Raw Score: 16   Approx Degree of Impairment: 54.16%   Standardized Score (AM-PAC Scale): 40.78   CMS Modifier (G-Code): CK    FUNCTIONAL ABILITY STATUS  Functional Mobility/Gait Assessment  Gait Assistance: Contact guard assist  Distance (ft): 80 x2  Assistive Device: Rolling walker  Pattern:  (unsteady and slow, limited by mild SOB w/ stable vials on RA.)  Stairs: Other (comment) (NT)  Rolling: stand-by assist  Supine to Sit: contact guard assist  Sit to Supine: minimal assist  Sit to Stand: contact guard assist with walker    Exercise/Education Provided:  Bed mobility  Body mechanics  Energy conservation  Gait training  Posture  Strengthening  Transfer training    The patient's Approx Degree of Impairment: 54.16% has been calculated based on documentation in the [facility] '6 clicks' Inpatient Basic Mobility Short Form.  Research supports that patients with this level of impairment may benefit from JOAQUIM.  Final disposition will be made by interdisciplinary medical team.    Patient End of Session: Up in chair;Needs met;Call light within reach;RN aware of session/findings;Alarm set    CURRENT GOALS  Goals to be met by: 4.5.24  Patient Goal Patient's self-stated goal is: Did not state   Goal #1 Patient is able to demonstrate supine - sit EOB @ level: supervision     Goal #1   Current Status    Goal #2 Patient is able to demonstrate transfers Sit to/from Stand at  assistance level: modified independent with walker - rolling     Goal #2  Current Status    Goal #3 Patient is able to ambulate 150 feet with assist device: walker - rolling at assistance level: supervision   Goal #3   Current Status    Goal #4 Patient will negotiate 0 stairs/one curb w/ assistive device and supervision   Goal #4   Current Status    Goal #5 Patient to demonstrate independence with home activity/exercise instructions provided to patient in preparation for discharge.   Goal #5   Current Status    Goal #6    Goal #6  Current Status      Patient Evaluation Complexity Level:  History Low - no personal factors and/or co-morbidities   Examination of body systems Low -  addressing 1-2 elements   Clinical Presentation Low- Stable   Clinical Decision Making  Low Complexity     Gait Trainin minutes  Therapeutic Activity:  0 minutes  Neuromuscular Re-education: 0 minutes  Therapeutic Exercise: 0 minutes  [place] Repositionin minutes  Manual Therapy: 0 minutes  Can add/delete any of these

## 2024-03-30 NOTE — CONSULTS
[facility]  part of [facility]    Report of Consultation    Stacy Pal Patient Status:  Observation    1940 MRN [id number]   Location [facility] 5SW/SE Attending Bob Swift MD   Hosp Day # 0 PCP Eulalio Hernandez MD     Date of Admission:  3/29/2024  Date of Consult:  3/30/24  Reason for Consultation:   Hypoglycemia in a patient with Type 2 Diabetes    History of Present Illness:   Patient is a 84 year old female who was admitted to the hospital for Hypoglycemia:  Patient states that she has had type 2 diabetes for many years. She had originally been on pills and then after undergoing a surgery on her pancreas, she was started on long-acting insulin. Recently, she has been experiencing low blood sugars at night and higher blood sugars during the day. Her PCP then started her on mealtime insulin. Her blood sugars are now very variable. She has been admitted due to this,     She is currently taking:  Aspart 6 units tid with meals  Janumet XR 50-1g qAM  Farxiga 10mg  Pioglitazone 30mg  Tresiba     Past Medical History  Past Medical History:   Diagnosis Date    Anxiety state     Asthma (HCC)     Depression     Diabetes (HCC)     Essential hypertension     High blood pressure     High cholesterol     Hyperlipidemia     Sleep apnea        Past Surgical History  Past Surgical History:   Procedure Laterality Date    KNEE REPLACEMENT SURGERY      REMOVAL GALLBLADDER      REPAIR ING HERNIA,5+Y/O,REDUCIBL      SPLENECTOMY      TOTAL ABDOM HYSTERECTOMY         Family History  No family history on file.    Social History  Social History     Socioeconomic History    Marital status:    Tobacco Use    Smoking status: Never    Smokeless tobacco: Never   Vaping Use    Vaping Use: Never used   Substance and Sexual Activity    Alcohol use: Never    Drug use: Never     Social Determinants of Health     Food Insecurity: No Food Insecurity (3/30/2024)    Food Insecurity      Food Insecurity: Never true   Transportation Needs: No Transportation Needs (3/30/2024)    Transportation Needs     Lack of Transportation: No   Housing Stability: Low Risk  (3/30/2024)    Housing Stability     Housing Instability: No           Current Medications:  Current Facility-Administered Medications   Medication Dose Route Frequency    glucose (Dex4) 15 GM/59ML oral liquid 15 g  15 g Oral Q15 Min PRN    Or    glucose (Glutose) 40% oral gel 15 g  15 g Oral Q15 Min PRN    Or    glucose-vitamin C (Dex-4) chewable tab 4 tablet  4 tablet Oral Q15 Min PRN    Or    dextrose 50% injection 50 mL  50 mL Intravenous Q15 Min PRN    Or    glucose (Dex4) 15 GM/59ML oral liquid 30 g  30 g Oral Q15 Min PRN    Or    glucose (Glutose) 40% oral gel 30 g  30 g Oral Q15 Min PRN    Or    glucose-vitamin C (Dex-4) chewable tab 8 tablet  8 tablet Oral Q15 Min PRN    heparin (Porcine) 5000 UNIT/ML injection 5,000 Units  5,000 Units Subcutaneous Q8H LOTTIE    acetaminophen (Tylenol Extra Strength) tab 500 mg  500 mg Oral Q4H PRN    melatonin tab 3 mg  3 mg Oral Nightly PRN    ondansetron (Zofran) 4 MG/2ML injection 4 mg  4 mg Intravenous Q6H PRN    metoclopramide (Reglan) 5 mg/mL injection 10 mg  10 mg Intravenous Q8H PRN    insulin aspart (NovoLOG) 100 Units/mL FlexPen 1-7 Units  1-7 Units Subcutaneous TID CC    cefTRIAXone (Rocephin) 1 g in D5W 100 mL IVPB-ADD  1 g Intravenous Q24H    metRONIDAZOLE in sodium chloride 0.79% (Flagyl) 5 mg/mL IVPB premix 500 mg  500 mg Intravenous Q8H    cholecalciferol (Vitamin D3) tab 5,000 Units  5,000 Units Oral Daily    losartan (Cozaar) tab 100 mg  100 mg Oral Daily    pantoprazole (Protonix) DR tab 40 mg  40 mg Oral QAM AC    pregabalin (Lyrica) cap 100 mg  100 mg Oral BID    atorvastatin (Lipitor) tab 20 mg  20 mg Oral Nightly    everolimus TABS 5 mg (Patient Own Medication)  5 mg Oral Q24H     Medications Prior to Admission   Medication Sig    HYDROcodone-acetaminophen  MG Oral Tab  Take 1 tablet by mouth every 8 (eight) hours as needed for Pain.    pregabalin 100 MG Oral Cap Take 1 capsule (100 mg total) by mouth 2 (two) times daily.    insulin aspart 100 UNIT/ML Subcutaneous Solution Cartridge Inject 6 Units into the skin 3 (three) times daily before meals. Breakfast, lunch, dinner    everolimus 5 MG Oral Tab Take 5 mg by mouth See Admin Instructions. **Patient must take medication at 1500 with food**    SITagliptin-metFORMIN HCl ER (JANUMET XR)  MG Oral Tablet 24 Hr Take by mouth daily.    dapagliflozin Propanediol (FARXIGA) 10 MG Oral Tab Take 1 tablet (10 mg total) by mouth daily.    Pioglitazone HCl 30 MG Oral Tab Take 1 tablet (30 mg total) by mouth daily.    Pantoprazole Sodium 40 MG Oral Tab EC Take 1 tablet (40 mg total) by mouth every morning before breakfast.    losartan 100 MG Oral Tab Take by mouth daily.    simvastatin 40 MG Oral Tab Take 1 tablet (40 mg total) by mouth nightly.    Insulin Degludec (TRESIBA) 100 UNIT/ML Subcutaneous Solution Inject into the skin.    Cholecalciferol 125 MCG (5000 UT) Oral Tab Take 1 tablet (5,000 Units total) by mouth daily.       Allergies  Allergies   Allergen Reactions    Penicillins UNKNOWN       Review of Systems:   Pertinent items are noted in HPI.    Physical Exam:   Vital Signs:  Blood pressure 120/76, pulse 78, temperature 97.7 °F (36.5 °C), temperature source Oral, resp. rate 18, height 5' 5\" (1.651 m), weight 203 lb (92.1 kg), SpO2 94%.     General: No acute distress. Alert and oriented x 3.  HEENT: Moist mucous membranes. EOM-I. PERRL  Neck: No lymphadenopathy.  No JVD. No carotid bruits.  Respiratory: Clear to auscultation bilaterally.  No wheezes. No rhonchi.  Cardiovascular: S1, S2.  Regular rate and rhythm.  No murmurs. Equal pulses   Abdomen: Soft, nontender, nondistended.  Positive bowel sounds. No rebound tenderness  Neurologic: No focal neurological deficits.  Musculoskeletal: Full range of motion of all extremities.  No  swelling noted.  Integument: No lesions. No erythema.  Psychiatric: Appropriate mood and affect.    Results:     Laboratory Data:  Lab Results   Component Value Date    WBC 10.5 03/29/2024    HGB 10.3 (L) 03/29/2024    HCT 30.8 (L) 03/29/2024    .0 03/29/2024    CREATSERUM 0.72 03/29/2024    BUN 26 (H) 03/29/2024     03/29/2024    K 3.9 03/29/2024     03/29/2024    CO2 22.0 03/29/2024    GLU 83 03/29/2024    CA 9.2 03/29/2024    ALB 4.3 03/29/2024    ALKPHO 75 03/29/2024    TP 7.1 03/29/2024    AST 24 03/29/2024    ALT 16 03/29/2024    DDIMER 0.56 10/03/2020    TROP <0.045 10/03/2020         Imaging:  CT ABDOMEN+PELVIS(CONTRAST ONLY)(CPT=74177)    Result Date: 3/30/2024  CONCLUSION:  1. Nonspecific increase in small bowel fluid may represent a mild infectious enteritis, or could be related to fluid under absorption. 2. 3.2 cm left upper quadrant mass either arising from the upper pole of the left kidney, or abutting the left kidney related to known metastatic neuroendocrine tumor. 3. Post distal pancreatectomy and splenectomy. 4. Colonic diverticulosis.  No diverticulitis. 5. Bibasilar pneumonia and or atelectasis. 6. No major discrepancy with preliminary [facility] radiology report.    Dictated by (CST): Shubham Saenz MD on 3/30/2024 at 7:44 AM     Finalized by (CST): Shubham Saenz MD on 3/30/2024 at 8:02 AM              Impression:     Hypoglycemia  Resolved      Type 2 Diabetes  - Continue Medium-dose CF scale  - Start Tresiba 10 units at bedtime  - Start Aspart 3 units with meals    Diabetic Diet  Hypoglycemia Protocol      Thank you for allowing me to participate in the care of your patient.    Emily Nassar MD  3/30/2024

## 2024-03-30 NOTE — PLAN OF CARE
Up to chair with assist. Chemo medication given, family updated. All safety measures in place.     Problem: Patient Centered Care  Goal: Patient preferences are identified and integrated in the patient's plan of care  Description: Interventions:  - What would you like us to know as we care for you? I am from [place], I like to cook  - Provide timely, complete, and accurate information to patient/family  - Incorporate patient and family knowledge, values, beliefs, and cultural backgrounds into the planning and delivery of care  - Encourage patient/family to participate in care and decision-making at the level they choose  - Honor patient and family perspectives and choices  Outcome: Progressing     Problem: Diabetes/Glucose Control  Goal: Glucose maintained within prescribed range  Description: INTERVENTIONS:  - Monitor Blood Glucose as ordered  - Assess for signs and symptoms of hyperglycemia and hypoglycemia  - Administer ordered medications to maintain glucose within target range  - Assess barriers to adequate nutritional intake and initiate nutrition consult as needed  - Instruct patient on self management of diabetes  Outcome: Progressing     Problem: Patient/Family Goals  Goal: Patient/Family Long Term Goal  Description: Patient's Long Term Goal: get diabetic med clarification    Interventions:  - endocrine consult   - clear discharge instructions  -family involvement   - See additional Care Plan goals for specific interventions  Outcome: Progressing  Goal: Patient/Family Short Term Goal  Description: Patient's Short Term Goal: get stronger    Interventions:   - pt/ot  -up to chair and bathroom   -increase oral intake  - See additional Care Plan goals for specific interventions  Outcome: Progressing     Problem: PAIN - ADULT  Goal: Verbalizes/displays adequate comfort level or patient's stated pain goal  Description: INTERVENTIONS:  - Encourage pt to monitor pain and request assistance  - Assess pain using  appropriate pain scale  - Administer analgesics based on type and severity of pain and evaluate response  - Implement non-pharmacological measures as appropriate and evaluate response  - Consider cultural and social influences on pain and pain management  - Manage/alleviate anxiety  - Utilize distraction and/or relaxation techniques  - Monitor for opioid side effects  - Notify MD/LIP if interventions unsuccessful or patient reports new pain  - Anticipate increased pain with activity and pre-medicate as appropriate  Outcome: Progressing     Problem: RISK FOR INFECTION - ADULT  Goal: Absence of fever/infection during anticipated neutropenic period  Description: INTERVENTIONS  - Monitor WBC  - Administer growth factors as ordered  - Implement neutropenic guidelines  Outcome: Progressing     Problem: SAFETY ADULT - FALL  Goal: Free from fall injury  Description: INTERVENTIONS:  - Assess pt frequently for physical needs  - Identify cognitive and physical deficits and behaviors that affect risk of falls.  - [place] fall precautions as indicated by assessment.  - Educate pt/family on patient safety including physical limitations  - Instruct pt to call for assistance with activity based on assessment  - Modify environment to reduce risk of injury  - Provide assistive devices as appropriate  - Consider OT/PT consult to assist with strengthening/mobility  - Encourage toileting schedule  Outcome: Progressing     Problem: DISCHARGE PLANNING  Goal: Discharge to home or other facility with appropriate resources  Description: INTERVENTIONS:  - Identify barriers to discharge w/pt and caregiver  - Include patient/family/discharge partner in discharge planning  - Arrange for needed discharge resources and transportation as appropriate  - Identify discharge learning needs (meds, wound care, etc)  - Arrange for interpreters to assist at discharge as needed  - Consider post-discharge preferences of patient/family/discharge partner  -  Complete POLST form as appropriate  - Assess patient's ability to be responsible for managing their own health  - Refer to Case Management Department for coordinating discharge planning if the patient needs post-hospital services based on physician/LIP order or complex needs related to functional status, cognitive ability or social support system  Outcome: Progressing

## 2024-03-31 LAB
GLUCOSE BLDC GLUCOMTR-MCNC: 113 MG/DL (ref 70–99)
GLUCOSE BLDC GLUCOMTR-MCNC: 153 MG/DL (ref 70–99)
GLUCOSE BLDC GLUCOMTR-MCNC: 178 MG/DL (ref 70–99)
GLUCOSE BLDC GLUCOMTR-MCNC: 262 MG/DL (ref 70–99)

## 2024-03-31 PROCEDURE — 96367 TX/PROPH/DG ADDL SEQ IV INF: CPT

## 2024-03-31 PROCEDURE — 96372 THER/PROPH/DIAG INJ SC/IM: CPT

## 2024-03-31 PROCEDURE — 82962 GLUCOSE BLOOD TEST: CPT

## 2024-03-31 PROCEDURE — 96366 THER/PROPH/DIAG IV INF ADDON: CPT

## 2024-03-31 PROCEDURE — 96376 TX/PRO/DX INJ SAME DRUG ADON: CPT

## 2024-03-31 NOTE — PROGRESS NOTES
03/31/24 1429   VISIT TYPE   OT Inpatient Visit Type (Documentation Required) Attempted Evaluation     Orders received and chart reviewed.  Pt req to hold OT eval d/t Religion program playing during easter holiday.  Will cont to follow.

## 2024-03-31 NOTE — DIETARY NOTE
BRIEF DIETITIAN NOTE      Patient Status 03/31/24: Pt seen for consult for \"diabetic, low blood sugars at night\", additionally MST at risk. Found to be at low risk at this time. Pt admitted for hypoglycemia. Visited pt today, pleasant lady and talkative. Pt reported good appetite, consuming avg. of 75% of most meals per documentation. Pt reported good po intakes PTA, endorsed consuming 3 full meals per day, does not skip meals, occasionally snacks on nuts; denied decreased po intakes PTA. Weight relatively stable, per Wt Hx on EHR, wt of 210# in October 2023 and wt of 227# in May 2023. Respectively, they represent a 3.3% weight loss in 5 months and 10% weight loss in 1 year, not significant. Labs reviewed, POC glucose levels mostly elevated, no signs of hypoglycemia. A1c 8.0% as of 3/30/24, CDE to visit pt to conduct DM education per protocol. Endocrinology following, per latest note adjusted DM medications. RD acknowledges dietary consult, appraises reason for consult now resolved, Endo to manage further. No nutrition intervention at this time. May recommend ONS if po intakes fall below 75% of meals or POC glucose levels below normal levels. Will follow per protocol. Please consult RD if earlier nutrition intervention is needed.     Recent Labs     03/29/24 2148   GLU 83   BUN 26*   CREATSERUM 0.72   CA 9.2      K 3.9      CO2 22.0   OSMOCALC 296*            HGBA1C:    Lab Results   Component Value Date    A1C 8.0 (H) 03/30/2024    A1C 7.5 (H) 10/03/2020     (H) 03/30/2024        Percent Meals Eaten (last 6 days)       Date/Time Percent Meals Eaten (%)    03/30/24 0906 90 %    03/30/24 1200 50 %    03/30/24 1500 100 %     Percent Meals Eaten (%): snack with chemo medication at 03/30/24 1500    03/30/24 2106 50 %    03/31/24 1054 75 %    03/31/24 1300 80 %             Patient Weight(s) for the past 336 hrs:   Weight   03/30/24 0251 92.1 kg (203 lb)   03/29/24 2141 89.4 kg (197 lb)        Wt  Readings from Last 6 Encounters:   03/30/24 92.1 kg (203 lb)   10/26/20 99.8 kg (220 lb)   10/05/20 94.8 kg (209 lb)        F/u per protocol or as appropriate.       Nu Perkins RD, LDN  Clinical Dietitian  Office: [phone]

## 2024-03-31 NOTE — PLAN OF CARE
Problem: Patient Centered Care  Goal: Patient preferences are identified and integrated in the patient's plan of care  Description: Interventions:  - What would you like us to know as we care for you? I am from [place], I like to cook  - Provide timely, complete, and accurate information to patient/family  - Incorporate patient and family knowledge, values, beliefs, and cultural backgrounds into the planning and delivery of care  - Encourage patient/family to participate in care and decision-making at the level they choose  - Honor patient and family perspectives and choices  Outcome: Progressing     Problem: Diabetes/Glucose Control  Goal: Glucose maintained within prescribed range  Description: INTERVENTIONS:  - Monitor Blood Glucose as ordered  - Assess for signs and symptoms of hyperglycemia and hypoglycemia  - Administer ordered medications to maintain glucose within target range  - Assess barriers to adequate nutritional intake and initiate nutrition consult as needed  - Instruct patient on self management of diabetes  Outcome: Progressing     Problem: Patient/Family Goals  Goal: Patient/Family Long Term Goal  Description: Patient's Long Term Goal: get diabetic med clarification    Interventions:  - endocrine consult   - clear discharge instructions  -family involvement   - See additional Care Plan goals for specific interventions  Outcome: Progressing  Goal: Patient/Family Short Term Goal  Description: Patient's Short Term Goal: get stronger    Interventions:   - pt/ot  -up to chair and bathroom   -increase oral intake  - See additional Care Plan goals for specific interventions  Outcome: Progressing     Problem: PAIN - ADULT  Goal: Verbalizes/displays adequate comfort level or patient's stated pain goal  Description: INTERVENTIONS:  - Encourage pt to monitor pain and request assistance  - Assess pain using appropriate pain scale  - Administer analgesics based on type and severity of pain and evaluate  response  - Implement non-pharmacological measures as appropriate and evaluate response  - Consider cultural and social influences on pain and pain management  - Manage/alleviate anxiety  - Utilize distraction and/or relaxation techniques  - Monitor for opioid side effects  - Notify MD/LIP if interventions unsuccessful or patient reports new pain  - Anticipate increased pain with activity and pre-medicate as appropriate  Outcome: Progressing     Problem: RISK FOR INFECTION - ADULT  Goal: Absence of fever/infection during anticipated neutropenic period  Description: INTERVENTIONS  - Monitor WBC  - Administer growth factors as ordered  - Implement neutropenic guidelines  Outcome: Progressing     Problem: SAFETY ADULT - FALL  Goal: Free from fall injury  Description: INTERVENTIONS:  - Assess pt frequently for physical needs  - Identify cognitive and physical deficits and behaviors that affect risk of falls.  - [place] fall precautions as indicated by assessment.  - Educate pt/family on patient safety including physical limitations  - Instruct pt to call for assistance with activity based on assessment  - Modify environment to reduce risk of injury  - Provide assistive devices as appropriate  - Consider OT/PT consult to assist with strengthening/mobility  - Encourage toileting schedule  Outcome: Progressing     Problem: DISCHARGE PLANNING  Goal: Discharge to home or other facility with appropriate resources  Description: INTERVENTIONS:  - Identify barriers to discharge w/pt and caregiver  - Include patient/family/discharge partner in discharge planning  - Arrange for needed discharge resources and transportation as appropriate  - Identify discharge learning needs (meds, wound care, etc)  - Arrange for interpreters to assist at discharge as needed  - Consider post-discharge preferences of patient/family/discharge partner  - Complete POLST form as appropriate  - Assess patient's ability to be responsible for managing  their own health  - Refer to Case Management Department for coordinating discharge planning if the patient needs post-hospital services based on physician/LIP order or complex needs related to functional status, cognitive ability or social support system  Outcome: Progressing

## 2024-03-31 NOTE — PROGRESS NOTES
[facility]  part of [facility]    Progress Note    Stacy Theodosis Patient Status:  Observation    1940 MRN [id number]   Location [facility] 5SW/SE Attending Corwin Casarez MD   Hosp Day # 0 PCP Eulalio Hernandez MD       Subjective:     Pt denies complaint.  No abd pain.  No diarrhea.    Objective:   Blood pressure 122/65, pulse 71, temperature 98.1 °F (36.7 °C), resp. rate 18, height 5' 5\" (1.651 m), weight 203 lb (92.1 kg), SpO2 92%.    Gen:   NAD.  A and O x 3  CV:   RRR, no m/g/r  Pulm:   CTA bilat  Abd:   +bs, soft, NT, ND  LE:   No c/c/e  Neuro:   nonfocal    Results:     Lab Results   Component Value Date    WBC 10.5 2024    HGB 10.3 (L) 2024    HCT 30.8 (L) 2024    .0 2024    CREATSERUM 0.72 2024    BUN 26 (H) 2024     2024    K 3.9 2024     2024    CO2 22.0 2024    GLU 83 2024    CA 9.2 2024    ALB 4.3 2024    ALKPHO 75 2024    BILT 0.3 2024    TP 7.1 2024    AST 24 2024    ALT 16 2024    DDIMER 0.56 10/03/2020    TROP <0.045 10/03/2020       CT ABDOMEN+PELVIS(CONTRAST ONLY)(CPT=74177)    Result Date: 3/30/2024  CONCLUSION:  1. Nonspecific increase in small bowel fluid may represent a mild infectious enteritis, or could be related to fluid under absorption. 2. 3.2 cm left upper quadrant mass either arising from the upper pole of the left kidney, or abutting the left kidney related to known metastatic neuroendocrine tumor. 3. Post distal pancreatectomy and splenectomy. 4. Colonic diverticulosis.  No diverticulitis. 5. Bibasilar pneumonia and or atelectasis. 6. No major discrepancy with preliminary [facility] radiology report.    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  - CT with possible enterocolitis, NO diverticulitis  - Short course of antibiotics     # History of extended distal pancreatectomy, splenectomy due to pancreatic neuroendocrine neoplasm  - Continue home chemo agent - everolimus     # Soft tissue density between stomach and left kidney  -Will need follow-up/Review of prior images for chronicity     # HTN  # HLD  # CAD  -Resume home medications as appropriate     # Generalized pain,  -As needed Tylenol     # HONG, not on CPAP      consult for possible placement  PT/OT for placement evaluation    dvt proph:    heparin      Code status:    Full       MDM:    [place] Corwin Thurston MD  3/31/2024

## 2024-03-31 NOTE — CONSULTS
Oncology consulted because the patient is on everolimus 5 mg daily for recurrent, metastatic pancreatic neuroendocrine tumor, and I been consulted with recommendations on continuing the medication while she remains in house.  [facility]  She also has a history of insulin-dependent diabetes and was admitted with hypoglycemia.  Okay to continue everolimus while she remains admitted.  Follow-up with her primary oncologist in the outpatient setting after discharge.  I will sign off, however if there is further oncology input that is needed during her stay please do not hesitate to reach out with questions or concerns.    Anupam Stubbs DO  [facility]  Holli NUNES [facility]

## 2024-04-01 ENCOUNTER — APPOINTMENT (OUTPATIENT)
Dept: GENERAL RADIOLOGY | Facility: HOSPITAL | Age: 84
End: 2024-04-01
Attending: HOSPITALIST
Payer: MEDICARE

## 2024-04-01 LAB
ANION GAP SERPL CALC-SCNC: 5 MMOL/L (ref 0–18)
BASOPHILS # BLD AUTO: 0.02 X10(3) UL (ref 0–0.2)
BASOPHILS NFR BLD AUTO: 0.2 %
BNP SERPL-MCNC: 157 PG/ML
BUN BLD-MCNC: 13 MG/DL (ref 9–23)
BUN/CREAT SERPL: 23.6 (ref 10–20)
CALCIUM BLD-MCNC: 9 MG/DL (ref 8.7–10.4)
CHLORIDE SERPL-SCNC: 110 MMOL/L (ref 98–112)
CO2 SERPL-SCNC: 28 MMOL/L (ref 21–32)
CREAT BLD-MCNC: 0.55 MG/DL
DEPRECATED RDW RBC AUTO: 41.5 FL (ref 35.1–46.3)
EGFRCR SERPLBLD CKD-EPI 2021: 90 ML/MIN/1.73M2 (ref 60–?)
EOSINOPHIL # BLD AUTO: 0.25 X10(3) UL (ref 0–0.7)
EOSINOPHIL NFR BLD AUTO: 2.5 %
ERYTHROCYTE [DISTWIDTH] IN BLOOD BY AUTOMATED COUNT: 15.6 % (ref 11–15)
GLUCOSE BLD-MCNC: 88 MG/DL (ref 70–99)
GLUCOSE BLDC GLUCOMTR-MCNC: 106 MG/DL (ref 70–99)
GLUCOSE BLDC GLUCOMTR-MCNC: 173 MG/DL (ref 70–99)
GLUCOSE BLDC GLUCOMTR-MCNC: 188 MG/DL (ref 70–99)
GLUCOSE BLDC GLUCOMTR-MCNC: 208 MG/DL (ref 70–99)
HCT VFR BLD AUTO: 29.7 %
HGB BLD-MCNC: 9.4 G/DL
IMM GRANULOCYTES # BLD AUTO: 0.04 X10(3) UL (ref 0–1)
IMM GRANULOCYTES NFR BLD: 0.4 %
LYMPHOCYTES # BLD AUTO: 3.8 X10(3) UL (ref 1–4)
LYMPHOCYTES NFR BLD AUTO: 38.1 %
MCH RBC QN AUTO: 23.6 PG (ref 26–34)
MCHC RBC AUTO-ENTMCNC: 31.6 G/DL (ref 31–37)
MCV RBC AUTO: 74.6 FL
MONOCYTES # BLD AUTO: 2.09 X10(3) UL (ref 0.1–1)
MONOCYTES NFR BLD AUTO: 21 %
NEUTROPHILS # BLD AUTO: 3.77 X10 (3) UL (ref 1.5–7.7)
NEUTROPHILS # BLD AUTO: 3.77 X10(3) UL (ref 1.5–7.7)
NEUTROPHILS NFR BLD AUTO: 37.8 %
OSMOLALITY SERPL CALC.SUM OF ELEC: 296 MOSM/KG (ref 275–295)
PLATELET # BLD AUTO: 362 10(3)UL (ref 150–450)
POTASSIUM SERPL-SCNC: 3.6 MMOL/L (ref 3.5–5.1)
RBC # BLD AUTO: 3.98 X10(6)UL
SODIUM SERPL-SCNC: 143 MMOL/L (ref 136–145)
WBC # BLD AUTO: 10 X10(3) UL (ref 4–11)

## 2024-04-01 PROCEDURE — 97535 SELF CARE MNGMENT TRAINING: CPT

## 2024-04-01 PROCEDURE — 96376 TX/PRO/DX INJ SAME DRUG ADON: CPT

## 2024-04-01 PROCEDURE — 85060 BLOOD SMEAR INTERPRETATION: CPT | Performed by: HOSPITALIST

## 2024-04-01 PROCEDURE — 83880 ASSAY OF NATRIURETIC PEPTIDE: CPT | Performed by: HOSPITALIST

## 2024-04-01 PROCEDURE — 97116 GAIT TRAINING THERAPY: CPT

## 2024-04-01 PROCEDURE — 71045 X-RAY EXAM CHEST 1 VIEW: CPT | Performed by: HOSPITALIST

## 2024-04-01 PROCEDURE — 82962 GLUCOSE BLOOD TEST: CPT

## 2024-04-01 PROCEDURE — 85025 COMPLETE CBC W/AUTO DIFF WBC: CPT | Performed by: HOSPITALIST

## 2024-04-01 PROCEDURE — 97165 OT EVAL LOW COMPLEX 30 MIN: CPT

## 2024-04-01 PROCEDURE — 96372 THER/PROPH/DIAG INJ SC/IM: CPT

## 2024-04-01 PROCEDURE — 96366 THER/PROPH/DIAG IV INF ADDON: CPT

## 2024-04-01 PROCEDURE — 80048 BASIC METABOLIC PNL TOTAL CA: CPT | Performed by: HOSPITALIST

## 2024-04-01 NOTE — PROGRESS NOTES
[facility]  part of [facility]    Progress Note    Stacy Pal Patient Status:  Observation    1940 MRN [id number]   Location [facility] 5SW/SE Attending Omkar Varela MD   Hosp Day # 0 PCP Eulalio Hernandez MD     Chief Complaint:   Chief Complaint   Patient presents with    Hypoglycemia       Subjective:   Stacy Pal is c/o some SOB today when she got up from the bed and from the toilet. No CP. Minimal abd discomfort but feels it's getting better and is chronic. Appetite good. No diarrhea no fevers.  She tells me her sugars were running low at home too and feels her doctor is giving her too much medications.     Objective:   Objective:    Blood pressure 133/62, pulse 84, temperature 98.2 °F (36.8 °C), temperature source Oral, resp. rate 18, height 5' 5\" (1.651 m), weight 203 lb (92.1 kg), SpO2 92%.    Physical Exam:    General: No acute distress.   Respiratory: bibasilar crackles   Cardiovascular: S1, S2. Regular rate and rhythm. No murmurs, rubs or gallops.   Abdomen: Soft, nontender, nondistended.  Positive bowel sounds. No rebound or guarding.  Neurologic: No focal neurological deficits.   Musculoskeletal: Moves all extremities.  Extremities: No edema.      Results:   Results:    Labs:  Recent Labs   Lab 24  0528   WBC 10.5 10.0   HGB 10.3* 9.4*   MCV 72.8* 74.6*   .0 362.0       Recent Labs   Lab 24  0528   GLU 83 88   BUN 26* 13   CREATSERUM 0.72 0.55   CA 9.2 9.0   ALB 4.3  --     143   K 3.9 3.6    110   CO2 22.0 28.0   ALKPHO 75  --    AST 24  --    ALT 16  --    BILT 0.3  --    TP 7.1  --        Estimated Creatinine Clearance: 68.5 mL/min (based on SCr of 0.55 mg/dL).    No results for input(s): \"PTP\", \"INR\" in the last 168 hours.         Culture:  Hospital Encounter on 24   1. Urine Culture, Routine     Status: Abnormal    Collection Time: 24 10:24 PM    Specimen:  Urine, clean catch   Result Value Ref Range    Urine Culture <10,000 CFU/ML Gram Negative Jose (A) N/A       Cardiac  No results for input(s): \"TROP\", \"PBNP\" in the last 168 hours.      Imaging: Imaging data reviewed in Epic.  No results found.    Medications:    heparin  5,000 Units Subcutaneous Q8H LOTTIE    cefTRIAXone  1 g Intravenous Q24H    metRONIDAZOLE  500 mg Intravenous Q8H    cholecalciferol  5,000 Units Oral Daily    losartan  100 mg Oral Daily    pantoprazole  40 mg Oral QAM AC    pregabalin  100 mg Oral BID    atorvastatin  20 mg Oral Nightly    everolimus  5 mg Oral Q24H    insulin aspart  1-7 Units Subcutaneous TID CC and HS    insulin aspart  3 Units Subcutaneous TID CC    insulin degludec  10 Units Subcutaneous Nightly         Assessment and Plan:   Assessment & Plan:      Hypoglycemia, resolved  -If recurs, treatment per protocol  - endocrine on consult     IDDM, uncontrolled  - A1c 8.0  - endocrine on consult  - cont SQ insulin regimen per endocrine on consult      Left lower quadrant abdominal pain, chronic  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   - C.diff negative.   - no diarrhea since admission. Abd pain better.     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     Update: Long discussion with pt's son Jim and daughter in law Barb - they are concerned about patient discharging home with [facility]. Son says pt \"overmedicates\" herself and she lives alone. When I brought up family assisting with her medications or going to an assisted living - they mentioned they  live 1 hour away and pt doesn't have finances to pay for a 24 hr caregiver and lives in a 1bedroom place.  Per PT she did well with therapy and would benefit from home PT. As far as home medications they will need to arrange more assistance in the outpt setting. She is admitted under obs status for hypoglycemia. Her CT showed mild enteritis which is better no diarrhea since being here. D/w son Jim and daughter in law on the phone. They are very frustrated. I did speak to SW about reaching out to them again. Will plan to hold discharge today and discharge tomorrow.       >55min spent, >50% spent counseling and coordinating care in the form of educating pt/family and d/w consultants and staff. Most of the time spent discussing the above plan.        Plan of care discussed with patient or family at bedside.    Omkar Varela MD  Hospitalist          Supplementary Documentation:     Quality:  DVT Prophylaxis: heparin   CODE status: Full  Dispo: per clinical course           Estimated date of discharge: TBD  Discharge is dependent on: clinical stability  At this point Ms. Pal is expected to be discharge to: [facility]

## 2024-04-01 NOTE — PROGRESS NOTES
[facility]  part of [facility]    Report of Consultation    Stacy Pal Patient Status:  Observation    1940 MRN [id number]   Location [facility] 5SW/SE Attending Bob Swift MD   Hosp Day # 0 PCP Eulalio Hernandez MD     Date of Admission:  3/29/2024  Date of Consult:  3/30/24  Reason for Consultation:   Hypoglycemia in a patient with Type 2 Diabetes    History of Present Illness:   Patient is a 84 year old female who was admitted to the hospital for Hypoglycemia:  Patient states that she has had type 2 diabetes for many years. She had originally been on pills and then after undergoing a surgery on her pancreas, she was started on long-acting insulin. Recently, she has been experiencing low blood sugars at night and higher blood sugars during the day. Her PCP then started her on mealtime insulin. Her blood sugars are now very variable. She has been admitted due to this,     She is currently taking:  Aspart 6 units tid with meals  Janumet XR 50-1g qAM  Farxiga 10mg  Pioglitazone 30mg  Tresiba     Past Medical History  Past Medical History:   Diagnosis Date    Anxiety state     Asthma (HCC)     Depression     Diabetes (HCC)     Essential hypertension     High blood pressure     High cholesterol     Hyperlipidemia     Sleep apnea        Past Surgical History  Past Surgical History:   Procedure Laterality Date    KNEE REPLACEMENT SURGERY      REMOVAL GALLBLADDER      REPAIR ING HERNIA,5+Y/O,REDUCIBL      SPLENECTOMY      TOTAL ABDOM HYSTERECTOMY         Family History  No family history on file.    Social History  Social History     Socioeconomic History    Marital status:    Tobacco Use    Smoking status: Never    Smokeless tobacco: Never   Vaping Use    Vaping Use: Never used   Substance and Sexual Activity    Alcohol use: Never    Drug use: Never     Social Determinants of Health     Food Insecurity: No Food Insecurity (3/30/2024)    Food Insecurity      Food Insecurity: Never true   Transportation Needs: No Transportation Needs (3/30/2024)    Transportation Needs     Lack of Transportation: No   Housing Stability: Low Risk  (3/30/2024)    Housing Stability     Housing Instability: No           Current Medications:      Medications Prior to Admission   Medication Sig    HYDROcodone-acetaminophen  MG Oral Tab Take 1 tablet by mouth every 8 (eight) hours as needed for Pain.    pregabalin 100 MG Oral Cap Take 1 capsule (100 mg total) by mouth 2 (two) times daily.    insulin aspart 100 UNIT/ML Subcutaneous Solution Cartridge Inject 6 Units into the skin 3 (three) times daily before meals. Breakfast, lunch, dinner    everolimus 5 MG Oral Tab Take 5 mg by mouth See Admin Instructions. **Patient must take medication at 1500 with food**    SITagliptin-metFORMIN HCl ER (JANUMET XR)  MG Oral Tablet 24 Hr Take by mouth daily.    dapagliflozin Propanediol (FARXIGA) 10 MG Oral Tab Take 1 tablet (10 mg total) by mouth daily.    Pioglitazone HCl 30 MG Oral Tab Take 1 tablet (30 mg total) by mouth daily.    Pantoprazole Sodium 40 MG Oral Tab EC Take 1 tablet (40 mg total) by mouth every morning before breakfast.    losartan 100 MG Oral Tab Take by mouth daily.    simvastatin 40 MG Oral Tab Take 1 tablet (40 mg total) by mouth nightly.    Insulin Degludec (TRESIBA) 100 UNIT/ML Subcutaneous Solution Inject into the skin.    Cholecalciferol 125 MCG (5000 UT) Oral Tab Take 1 tablet (5,000 Units total) by mouth daily.       Allergies  Allergies   Allergen Reactions    Penicillins UNKNOWN       Review of Systems:   Pertinent items are noted in HPI.    Physical Exam:   Vital Signs:  Blood pressure 143/81, pulse 77, temperature 98.2 °F (36.8 °C), temperature source Oral, resp. rate 18, height 5' 5\" (1.651 m), weight 203 lb (92.1 kg), SpO2 96%.     General: No acute distress. Alert and oriented x 3.  HEENT: Moist mucous membranes. EOM-I. PERRL  Neck: No lymphadenopathy.  No  JVD. No carotid bruits.  Respiratory: Clear to auscultation bilaterally.  No wheezes. No rhonchi.  Cardiovascular: S1, S2.  Regular rate and rhythm.  No murmurs. Equal pulses   Abdomen: Soft, nontender, nondistended.  Positive bowel sounds. No rebound tenderness  Neurologic: No focal neurological deficits.  Musculoskeletal: Full range of motion of all extremities.  No swelling noted.  Integument: No lesions. No erythema.  Psychiatric: Appropriate mood and affect.    Results:     Laboratory Data:  Lab Results   Component Value Date    WBC 10.5 03/29/2024    HGB 10.3 (L) 03/29/2024    HCT 30.8 (L) 03/29/2024    .0 03/29/2024    CREATSERUM 0.72 03/29/2024    BUN 26 (H) 03/29/2024     03/29/2024    K 3.9 03/29/2024     03/29/2024    CO2 22.0 03/29/2024    GLU 83 03/29/2024    CA 9.2 03/29/2024    ALB 4.3 03/29/2024    ALKPHO 75 03/29/2024    TP 7.1 03/29/2024    AST 24 03/29/2024    ALT 16 03/29/2024    DDIMER 0.56 10/03/2020    TROP <0.045 10/03/2020         Imaging:  CT ABDOMEN+PELVIS(CONTRAST ONLY)(CPT=74177)    Result Date: 3/30/2024  CONCLUSION:  1. Nonspecific increase in small bowel fluid may represent a mild infectious enteritis, or could be related to fluid under absorption. 2. 3.2 cm left upper quadrant mass either arising from the upper pole of the left kidney, or abutting the left kidney related to known metastatic neuroendocrine tumor. 3. Post distal pancreatectomy and splenectomy. 4. Colonic diverticulosis.  No diverticulitis. 5. Bibasilar pneumonia and or atelectasis. 6. No major discrepancy with preliminary [facility] radiology report.    Dictated by (CST): Shubham Saenz MD on 3/30/2024 at 7:44 AM     Finalized by (CST): Shubham Saenz MD on 3/30/2024 at 8:02 AM              Impression:     Hypoglycemia  Resolved      Type 2 Diabetes  - Continue Medium-dose CF scale  - Continue Tresiba 10 units at bedtime  - Continue Aspart 3 units with meals    Patient will see nutritionist  tomorrow    Diabetic Diet  Hypoglycemia Protocol      Thank you for allowing me to participate in the care of your patient.    Emily Nassar MD  3/31/2024

## 2024-04-01 NOTE — PLAN OF CARE
Problem: Patient Centered Care  Goal: Patient preferences are identified and integrated in the patient's plan of care  Description: Interventions:  - What would you like us to know as we care for you? I am from [place], I like to cook  - Provide timely, complete, and accurate information to patient/family  - Incorporate patient and family knowledge, values, beliefs, and cultural backgrounds into the planning and delivery of care  - Encourage patient/family to participate in care and decision-making at the level they choose  - Honor patient and family perspectives and choices  Outcome: Progressing     Problem: Diabetes/Glucose Control  Goal: Glucose maintained within prescribed range  Description: INTERVENTIONS:  - Monitor Blood Glucose as ordered  - Assess for signs and symptoms of hyperglycemia and hypoglycemia  - Administer ordered medications to maintain glucose within target range  - Assess barriers to adequate nutritional intake and initiate nutrition consult as needed  - Instruct patient on self management of diabetes  Outcome: Progressing     Problem: Patient/Family Goals  Goal: Patient/Family Long Term Goal  Description: Patient's Long Term Goal: get diabetic med clarification    Interventions:  - endocrine consult   - clear discharge instructions  -family involvement   - See additional Care Plan goals for specific interventions  Outcome: Progressing  Goal: Patient/Family Short Term Goal  Description: Patient's Short Term Goal: get stronger    Interventions:   - pt/ot  -up to chair and bathroom   -increase oral intake  - See additional Care Plan goals for specific interventions  Outcome: Progressing     Problem: PAIN - ADULT  Goal: Verbalizes/displays adequate comfort level or patient's stated pain goal  Description: INTERVENTIONS:  - Encourage pt to monitor pain and request assistance  - Assess pain using appropriate pain scale  - Administer analgesics based on type and severity of pain and evaluate  response  - Implement non-pharmacological measures as appropriate and evaluate response  - Consider cultural and social influences on pain and pain management  - Manage/alleviate anxiety  - Utilize distraction and/or relaxation techniques  - Monitor for opioid side effects  - Notify MD/LIP if interventions unsuccessful or patient reports new pain  - Anticipate increased pain with activity and pre-medicate as appropriate  Outcome: Progressing     Problem: RISK FOR INFECTION - ADULT  Goal: Absence of fever/infection during anticipated neutropenic period  Description: INTERVENTIONS  - Monitor WBC  - Administer growth factors as ordered  - Implement neutropenic guidelines  Outcome: Progressing     Problem: SAFETY ADULT - FALL  Goal: Free from fall injury  Description: INTERVENTIONS:  - Assess pt frequently for physical needs  - Identify cognitive and physical deficits and behaviors that affect risk of falls.  - [place] fall precautions as indicated by assessment.  - Educate pt/family on patient safety including physical limitations  - Instruct pt to call for assistance with activity based on assessment  - Modify environment to reduce risk of injury  - Provide assistive devices as appropriate  - Consider OT/PT consult to assist with strengthening/mobility  - Encourage toileting schedule  Outcome: Progressing     Problem: DISCHARGE PLANNING  Goal: Discharge to home or other facility with appropriate resources  Description: INTERVENTIONS:  - Identify barriers to discharge w/pt and caregiver  - Include patient/family/discharge partner in discharge planning  - Arrange for needed discharge resources and transportation as appropriate  - Identify discharge learning needs (meds, wound care, etc)  - Arrange for interpreters to assist at discharge as needed  - Consider post-discharge preferences of patient/family/discharge partner  - Complete POLST form as appropriate  - Assess patient's ability to be responsible for managing  their own health  - Refer to Case Management Department for coordinating discharge planning if the patient needs post-hospital services based on physician/LIP order or complex needs related to functional status, cognitive ability or social support system  Outcome: Progressing  Vital signs stable at this time. Monitoring blood glucose levels. Receiving IV antibiotics per order. Fall precautions maintained, bed locked in lowest position, bed alarm on, call light within reach and frequent rounding by nursing staff.

## 2024-04-01 NOTE — PLAN OF CARE
Pt alert and oriented. Vitals stable. Pt ambulating well in room with walker. Appetite good. No complaints of pain.   Problem: Patient Centered Care  Goal: Patient preferences are identified and integrated in the patient's plan of care  Description: Interventions:  - What would you like us to know as we care for you? I am from [facility], I like to cook  - Provide timely, complete, and accurate information to patient/family  - Incorporate patient and family knowledge, values, beliefs, and cultural backgrounds into the planning and delivery of care  - Encourage patient/family to participate in care and decision-making at the level they choose  - Honor patient and family perspectives and choices  Outcome: Progressing     Problem: Diabetes/Glucose Control  Goal: Glucose maintained within prescribed range  Description: INTERVENTIONS:  - Monitor Blood Glucose as ordered  - Assess for signs and symptoms of hyperglycemia and hypoglycemia  - Administer ordered medications to maintain glucose within target range  - Assess barriers to adequate nutritional intake and initiate nutrition consult as needed  - Instruct patient on self management of diabetes  Outcome: Progressing     Problem: Patient/Family Goals  Goal: Patient/Family Long Term Goal  Description: Patient's Long Term Goal: get diabetic med clarification    Interventions:  - endocrine consult   - clear discharge instructions  -family involvement   - See additional Care Plan goals for specific interventions  Outcome: Progressing  Goal: Patient/Family Short Term Goal  Description: Patient's Short Term Goal: get stronger    Interventions:   - pt/ot  -up to chair and bathroom   -increase oral intake  - See additional Care Plan goals for specific interventions  Outcome: Progressing     Problem: PAIN - ADULT  Goal: Verbalizes/displays adequate comfort level or patient's stated pain goal  Description: INTERVENTIONS:  - Encourage pt to monitor pain and request assistance  -  Assess pain using appropriate pain scale  - Administer analgesics based on type and severity of pain and evaluate response  - Implement non-pharmacological measures as appropriate and evaluate response  - Consider cultural and social influences on pain and pain management  - Manage/alleviate anxiety  - Utilize distraction and/or relaxation techniques  - Monitor for opioid side effects  - Notify MD/LIP if interventions unsuccessful or patient reports new pain  - Anticipate increased pain with activity and pre-medicate as appropriate  Outcome: Progressing     Problem: RISK FOR INFECTION - ADULT  Goal: Absence of fever/infection during anticipated neutropenic period  Description: INTERVENTIONS  - Monitor WBC  - Administer growth factors as ordered  - Implement neutropenic guidelines  Outcome: Progressing     Problem: SAFETY ADULT - FALL  Goal: Free from fall injury  Description: INTERVENTIONS:  - Assess pt frequently for physical needs  - Identify cognitive and physical deficits and behaviors that affect risk of falls.  - [place] fall precautions as indicated by assessment.  - Educate pt/family on patient safety including physical limitations  - Instruct pt to call for assistance with activity based on assessment  - Modify environment to reduce risk of injury  - Provide assistive devices as appropriate  - Consider OT/PT consult to assist with strengthening/mobility  - Encourage toileting schedule  Outcome: Progressing     Problem: DISCHARGE PLANNING  Goal: Discharge to home or other facility with appropriate resources  Description: INTERVENTIONS:  - Identify barriers to discharge w/pt and caregiver  - Include patient/family/discharge partner in discharge planning  - Arrange for needed discharge resources and transportation as appropriate  - Identify discharge learning needs (meds, wound care, etc)  - Arrange for interpreters to assist at discharge as needed  - Consider post-discharge preferences of  patient/family/discharge partner  - Complete POLST form as appropriate  - Assess patient's ability to be responsible for managing their own health  - Refer to Case Management Department for coordinating discharge planning if the patient needs post-hospital services based on physician/LIP order or complex needs related to functional status, cognitive ability or social support system  Outcome: Progressing

## 2024-04-01 NOTE — CM/SW NOTE
RALPH followed up on discharge planning.    Per SNF liaisons via Aidin messages, pt's public aid is not active due to a spend-down status.  Pt has not met spend down for this month and therefore PA benefit for [facility] not active.    Pt remains observation status and does not not have Medicare part A benefit for [facility].    RALPH sent tentative home health care referrals in Aidin for the pt as her disposition will be home due to insurance not covering rehab at this time.  Face to face certification uploaded.    RALPH spoke to pt's son Jim and wife Barb regarding plan.  Jim and Barb expressed frustration over observation status.  RALPH did ask VALENTINO Rodríguez from  to review case for inpatient criteria- does not qualify per Marcos.  Family to call pt's DCSS  Josephine to see what pt needs to submit for spend down to qualify for PA SNF care.    RALPH sent secure chat to Dr. Varela asking to review case as pt does not have rehab benefit under observation status.  Per Dr. Varela pt does not meet inpatient status.    Update 4:18 PM    RALPH sent DME referrals in Aidin for continuous glucose monitor.  VALENTINO Shafer from Diabetic education stated that pt does qualify for a CGM.    PLAN: TBD; likely home w/HHC and CGM    / to remain available for support and/or discharge planning.     Courtney Ansari MSW, LSW [id number]

## 2024-04-01 NOTE — DIABETES ED
[facility]    Diabetes Education  Note    Stacy Pal Patient Status:  Observation   1940 MRN [id number]  Location [facility] 5SW/SE Attending Omkar Varela MD  Hosp Day # 0 PCP Eulalio Hernandez MD      Labs:    HgbA1C (%)   Date Value   2024 8.0 (H)         Reason for Visit:Md order  Patient admitted with hypoglycemia. She reports her blood sugar have been lower for past 3-4 weeks and indicates 6 units of insulin at meals have been added in addition to taking all oral diabetes medications in the morning.  Reviewed and instructed on fasting glucose values of 110-120 and 2 hrs post meals of 160-180 mg/dL. She verbalizes understanding of glucose targets and reports typical values  except for past few weeks.   Diet recall indicates she consumes vegetables, proteins and smaller potions of carbohydrates with meals and snacks include vegetable or protein choice.    Instructed on signs, symptoms and treatment of hypoglycemia. Reinforced the importance of rechecking glucose 15 minutes after low blood sugar treatment. Discussed hypoglycemia treatment carb choices available to her at home.  She indicates her family wishes her to follow up with Endocrinologist in the outpatient setting. She is interested in wearing a CGM device to assist with glucose control.         Education Provided:  Blood glucose targets   Hypoglycemia symptoms/treatment/prevention  Basic Diet Guidelines  Importance of close follow up with PCP and medical team    Patient verbalized understanding and was receptive to information provided.      Recommendations:  Follow up with outpatient medical team  Attend outpatient diabetes education if directed by MD.          Soni Grant RN  Diabetes Educator  2024  11:52 AM

## 2024-04-01 NOTE — OCCUPATIONAL THERAPY NOTE
OCCUPATIONAL THERAPY EVALUATION - INPATIENT     Room Number: 547B/547-B  Evaluation Date: 4/1/2024  Type of Evaluation: Initial  Presenting Problem: hypoglycemia, diarrhea, acute diverticulitis  Hx HTN, asthma, DM, neuroendocrine tumor s/p whipple procedure, meningioma, CAD, GERD      Physician Order: IP Consult to Occupational Therapy  Reason for Therapy: ADL/IADL Dysfunction and Discharge Planning    OCCUPATIONAL THERAPY ASSESSMENT   Patient is a 84 year old female admitted 3/29/2024 for hypoglycemia, diarrhea, acute diverticulitis.  Prior to admission, patient's baseline is independent with ADLs and MI for fx mobility using rollator. Stated she has a caregiver a few times per week for assist with IADLs. Patient is currently functioning near baseline with ADLs and fx mobility/transfers.  Patient is requiring up to SBA as a result of the following impairments: decreased functional strength, decreased endurance, pain, impaired balance, decreased muscular endurance, and decreased aerobic capacity. Occupational Therapy will continue to follow for duration of hospitalization.    Patient will benefit from continued skilled OT Services For duration of hospitalization, however, given the patient is functioning near baseline level do not anticipate skilled therapy needs at discharge     PLAN  OT Treatment Plan: Balance activities;ADL training;Energy conservation/work simplification techniques;Functional transfer training;Endurance training;Patient/Family education;Patient/Family training;Equipment eval/education;Compensatory technique education     OCCUPATIONAL THERAPY MEDICAL/SOCIAL HISTORY   Problem List  Principal Problem:    Hypoglycemia  Active Problems:    Diarrhea, unspecified type    Acute diverticulitis    HOME SITUATION  Type of Home: Independent living facility  Home Layout: One level  Lives With: Alone  Drives: Yes  Patient Regularly Uses: Reading glasses  Use of Assistive Device(s):  Rollators      SUBJECTIVE  \"I feel short of breath.\"    OCCUPATIONAL THERAPY EXAMINATION    OBJECTIVE  Precautions: Bed/chair alarm  Fall Risk: Standard fall risk    PAIN ASSESSMENT  Rating: -- (not rated)  Location: back/abdominal pain  Management Techniques: Body mechanics; Activity promotion; Repositioning    ACTIVITY TOLERANCE  Pulse: 93  Heart Rate Source: Monitor                   O2 SATURATIONS  Oxygen Therapy  SPO2% Ambulation on Room Air: 91    COGNITION  Overall Cognitive Status:  WFL - within functional limits    SENSATION  Light touch:  intact    RANGE OF MOTION   Upper extremity ROM is within functional limits     STRENGTH ASSESSMENT  Upper extremity strength is within functional limits     COORDINATION  Gross Motor: WFL   Fine Motor: WFL     ACTIVITIES OF DAILY LIVING ASSESSMENT  AM-PAC ‘6-Clicks’ Inpatient Daily Activity Short Form  How much help from another person does the patient currently need…  -   Putting on and taking off regular lower body clothing?: A Little  -   Bathing (including washing, rinsing, drying)?: A Little  -   Toileting, which includes using toilet, bedpan or urinal? : A Little  -   Putting on and taking off regular upper body clothing?: None  -   Taking care of personal grooming such as brushing teeth?: None  -   Eating meals?: None    AM-PAC Score:  Score: 21  Approx Degree of Impairment: 32.79%  Standardized Score (AM-PAC Scale): 44.27  CMS Modifier (G-Code): CJ    BED MOBILITY  Not Tested - patient in chair upon therapist arrival     FUNCTIONAL TRANSFER ASSESSMENT  Sit to Stand: SUP  Chair Transfer: SUP    FUNCTIONAL MOBILITY  SUP for hallway fx mobility using RW    FUNCTIONAL ADL ASSESSMENT  Eating: independent seated (per obs)   Grooming: supervision standing at sink   UB Dressing: independent seated (per obs)   LB Dressing: stand-by assist  Toileting: supervision seated (per obs)     EDUCATION PROVIDED  Patient: Plan of Care; Role of Occupational Therapy; Discharge  Recommendations; Functional Transfer Techniques; Fall Prevention; Posture/Positioning; Energy Conservation; Compensatory ADL Techniques; Proper Body Mechanics  Patient's Response to Education: Returned Demonstration; Verbalized Understanding    The patient's Approx Degree of Impairment: 32.79% has been calculated based on documentation in the [facility] '6 clicks' Inpatient Daily Activity Short Form.  Research supports that patients with this level of impairment may benefit from discharge home without skilled OT needs.  Final disposition will be made by interdisciplinary medical team.     Patient End of Session: Up in chair;Needs met;Call light within reach;RN aware of session/findings;All patient questions and concerns addressed;Alarm set    OT Goals  Patients self stated goal is: return home     Patient will complete functional transfer with MI  Comment:     Patient will complete toileting with MI  Comment:     Patient will complete LB dressing with MI  Comment:    Patient will complete item retrieval with MI  Comment:          Goals  on: 4/15/24  Frequency: 1-2 f/u    Patient Evaluation Complexity Level:   Occupational Profile/Medical History LOW - Brief history including review of medical or therapy records    Specific performance deficits impacting engagement in ADL/IADL LOW  1 - 3 performance deficits    Client Assessment/Performance Deficits LOW - No comorbidities nor modifications of tasks    Clinical Decision Making LOW - Analysis of occupational profile, problem-focused assessments, limited treatment options    Overall Complexity LOW     OT Session Time  Self-Care Home Management: 9 minutes  Therapeutic Activity: 7 minutes    RIAN uPrcell/L  [facility]  #[id number]

## 2024-04-02 ENCOUNTER — PATIENT OUTREACH (OUTPATIENT)
Dept: CASE MANAGEMENT | Age: 84
End: 2024-04-02

## 2024-04-02 ENCOUNTER — TELEPHONE (OUTPATIENT)
Dept: ENDOCRINOLOGY CLINIC | Facility: CLINIC | Age: 84
End: 2024-04-02

## 2024-04-02 VITALS
RESPIRATION RATE: 18 BRPM | SYSTOLIC BLOOD PRESSURE: 143 MMHG | WEIGHT: 198.63 LBS | DIASTOLIC BLOOD PRESSURE: 60 MMHG | TEMPERATURE: 98 F | HEART RATE: 82 BPM | HEIGHT: 65 IN | OXYGEN SATURATION: 92 % | BODY MASS INDEX: 33.09 KG/M2

## 2024-04-02 LAB
ANION GAP SERPL CALC-SCNC: 6 MMOL/L (ref 0–18)
BUN BLD-MCNC: 14 MG/DL (ref 9–23)
BUN/CREAT SERPL: 20.9 (ref 10–20)
CALCIUM BLD-MCNC: 8.8 MG/DL (ref 8.7–10.4)
CHLORIDE SERPL-SCNC: 110 MMOL/L (ref 98–112)
CO2 SERPL-SCNC: 27 MMOL/L (ref 21–32)
CREAT BLD-MCNC: 0.67 MG/DL
DEPRECATED RDW RBC AUTO: 40.6 FL (ref 35.1–46.3)
EGFRCR SERPLBLD CKD-EPI 2021: 86 ML/MIN/1.73M2 (ref 60–?)
ERYTHROCYTE [DISTWIDTH] IN BLOOD BY AUTOMATED COUNT: 15.8 % (ref 11–15)
GLUCOSE BLD-MCNC: 155 MG/DL (ref 70–99)
GLUCOSE BLDC GLUCOMTR-MCNC: 123 MG/DL (ref 70–99)
GLUCOSE BLDC GLUCOMTR-MCNC: 173 MG/DL (ref 70–99)
HCT VFR BLD AUTO: 30.1 %
HGB BLD-MCNC: 9.6 G/DL
IRON SATN MFR SERPL: 13 %
IRON SERPL-MCNC: 27 UG/DL
MCH RBC QN AUTO: 23.6 PG (ref 26–34)
MCHC RBC AUTO-ENTMCNC: 31.9 G/DL (ref 31–37)
MCV RBC AUTO: 74 FL
OSMOLALITY SERPL CALC.SUM OF ELEC: 300 MOSM/KG (ref 275–295)
PLATELET # BLD AUTO: 349 10(3)UL (ref 150–450)
POTASSIUM SERPL-SCNC: 3.7 MMOL/L (ref 3.5–5.1)
PROCALCITONIN SERPL-MCNC: <0.04 NG/ML (ref ?–0.05)
RBC # BLD AUTO: 4.07 X10(6)UL
SODIUM SERPL-SCNC: 143 MMOL/L (ref 136–145)
TIBC SERPL-MCNC: 215 UG/DL (ref 250–425)
TRANSFERRIN SERPL-MCNC: 144 MG/DL (ref 250–380)
WBC # BLD AUTO: 11.1 X10(3) UL (ref 4–11)

## 2024-04-02 PROCEDURE — 96372 THER/PROPH/DIAG INJ SC/IM: CPT

## 2024-04-02 PROCEDURE — 80048 BASIC METABOLIC PNL TOTAL CA: CPT | Performed by: HOSPITALIST

## 2024-04-02 PROCEDURE — 84466 ASSAY OF TRANSFERRIN: CPT | Performed by: HOSPITALIST

## 2024-04-02 PROCEDURE — 83540 ASSAY OF IRON: CPT | Performed by: HOSPITALIST

## 2024-04-02 PROCEDURE — 84145 PROCALCITONIN (PCT): CPT | Performed by: HOSPITALIST

## 2024-04-02 PROCEDURE — 85027 COMPLETE CBC AUTOMATED: CPT | Performed by: HOSPITALIST

## 2024-04-02 PROCEDURE — 82962 GLUCOSE BLOOD TEST: CPT

## 2024-04-02 RX ORDER — FLUTICASONE PROPIONATE 50 MCG
1 SPRAY, SUSPENSION (ML) NASAL DAILY
Status: DISCONTINUED | OUTPATIENT
Start: 2024-04-02 | End: 2024-04-02

## 2024-04-02 RX ORDER — FLUTICASONE PROPIONATE 50 MCG
1 SPRAY, SUSPENSION (ML) NASAL DAILY
Qty: 16 ML | Refills: 0 | Status: SHIPPED | OUTPATIENT
Start: 2024-04-02

## 2024-04-02 RX ORDER — CETIRIZINE HYDROCHLORIDE 10 MG/1
10 TABLET ORAL DAILY
Status: DISCONTINUED | OUTPATIENT
Start: 2024-04-02 | End: 2024-04-02

## 2024-04-02 RX ORDER — MELATONIN
325
Status: DISCONTINUED | OUTPATIENT
Start: 2024-04-02 | End: 2024-04-02

## 2024-04-02 RX ORDER — AZITHROMYCIN 250 MG/1
500 TABLET, FILM COATED ORAL DAILY
Status: DISCONTINUED | OUTPATIENT
Start: 2024-04-02 | End: 2024-04-02

## 2024-04-02 RX ORDER — AZITHROMYCIN 250 MG/1
250 TABLET, FILM COATED ORAL DAILY
Status: DISCONTINUED | OUTPATIENT
Start: 2024-04-03 | End: 2024-04-02

## 2024-04-02 RX ORDER — CETIRIZINE HYDROCHLORIDE 10 MG/1
10 TABLET ORAL DAILY
Qty: 14 TABLET | Refills: 0 | Status: SHIPPED | OUTPATIENT
Start: 2024-04-02 | End: 2024-04-16

## 2024-04-02 NOTE — CM/SW NOTE
RALPH followed up on discharge planning.    Pt medically cleared for discharge and would like to go home.    RALPH spoke to Doretha from [facility] regarding a senior services evaluation.  Per Doretha, pt has 4 hours a day (16 hours per week) of homemaker services.  Doretha will contact [facility]  to let her know family has questions about pt's spend down.    RALPH spoke to Silvia from [facility] and stated pt will discharge and she will need diabetes education/reinforcement from home RN.  Recent clinicals faxed per request.    RALPH entered order for continuous glucose monitor. [employer] reserved in [employer].    RALPH spoke to Abbey from [employer].  Per Abbey, per Medicare regulations, a pt active with home health care would need that provider to order the CGM.  [facility] aware of this.  Per Abbey, [employer] will keep referral open and follow pt in the field for supplies after HHC episode ends.    RALPH spoke to Barb and son Jim.  Both expressed frustration about medication administered and pt's insulin regimen.  RALPH stressed that pt is medically cleared and safe to go home.  Jim stated the hospital \"is letting her dictate her discharge plan\" and \"she will be back soon\" referring to a readmission.  Jim also stated \"we have power over her\" referring to the pt.  No HCPOA on file.  Pt is alert and oriented x4- pt is always decision maker if AO x4.  RALPH again stressed that the pt is medically cleared and nothing further is being done for her inpatient.  Barb confirmed they picked up the CGM from the pharmacy.  VALENTINO Kasper updated.    PLAN: DC home w/ [facility]givers and [facility]    / to remain available for support and/or discharge planning.     Courtney Ansari MSW, LSW [phone]

## 2024-04-02 NOTE — DISCHARGE SUMMARY
[facility]ist Discharge Summary   Patient ID:  Stacy Pal  [id number]  84 year old  1/4/1940    Admit date: 3/29/2024  Discharge date: 4/2/2024  Primary Care Physician: Eulalio Hernandez MD   Attending Physician: Omkar Vareal MD   Consults:   Consultants         Provider   Role Specialty     Anupam Stubbs DO  Consulting Physician Hematology and Oncology     Debbie Obrien MD  Consulting Physician ENDOCRINOLOGY            Discharge Diagnoses:   Hypoglycemia    Reason for admission  Copied from admission H&P: Stacy Pal is a 84 year old female with IDDM, HTN, HLD, sleep apnea, meningioma, CAD, GERD who presented today for evaluation of hypoglycemia and possible placement.  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  EMT was called today due to patient 'feeling off'.  She was found to blood glucose 50, treated with orange juice  She has had recurrent recurrent hypoglycemia, PCP has been managing DM with Tresiba and oral medications. Dose recently decreased. Last A1c 8.3%  She lives alone and family concerned she cannot care for herself/follow directions or taking medications correctly.  On ROS, patient notes LLQ pain, chronic. Also chills and fever every few days treated with tylenol, has intermittent diarrhea attributed to chemo agent. No urinary symptoms.     Labs stable at presentation.  CT abd per ed physician: enterocolitis, possible diverticulitis  She is being admitted for further management of DM,  evaluation and possible placement. Antibiotics started.  Endocrinology has been consulted.       Hospital Course:  Hypoglycemia, resolved  -If recurs, treatment per protocol  - endocrine on consult  - Family concerned about pt self medicating   - Arranged for continuous glucose monitor to prevent hypoglycemia episodes.   - no further insulin on discharge.      IDDM, uncontrolled  - A1c 8.0  - endocrine on  consult  - cont SQ insulin regimen per endocrine on consult   - Plans for continuous glucose monitor on discharge to help detect hypoglycemia episodes if present.   - per endocrine no insulin needed on discharge. Will plan for oral meds.      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   - C.diff negative.   - no diarrhea since admission. Abd pain resolved.      Atelectasis   - CXR atelectasis.   - BNP low   - IS    - afebrile no hypoxia   - PCT low     Nonallergic rhinitis   Postnasal drip   - zyrtec  - flonase   - PCT normal. Afebrile.      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      Plans for home with [facility] today. Cleared PT/OT. Pt is A&O x 3 and competent to make her own decisions she refuses JOAQUIM even if needed. She is agreeable to home CGM. Long discussion with family yesterday - SW to assist with senior services at home. Family concerned about pt overmedicating at home - discussed options of family administering medication (however they live 1 hour away) vs hiring a caregiver to give medicine vs Assisted living. Needs more assistance at home. Once SW assists with safe discharge plan will plan for home today. Son Jim is quite upset with staff, condescending he doesn't want pt to go home and would like her to go to a facility sine he is worried she will self medication. Dr Nassar d/w son Jim insulin being discontinued to prevent hypoglycemia. Will also set up C to assist.        EXAM:   GENERAL: no apparent distress, comfortable  NEURO: A/A Ox3,  no focal deficits  RESP: non labored, CTAB/L  CARDIO: Regular, no murmur  ABD: soft, NT, ND  EXTREMITIES: no edema, no calf tenderness    Operative Procedures:     Discharge Instructions     Medication List        START taking these medications      cetirizine 10 MG Tabs  Commonly known as: ZyrTEC  Take 1 tablet (10 mg total) by mouth daily for 14 days.     fluticasone propionate 50 MCG/ACT Susp  Commonly known as: Flonase  1 spray by Each Nare route daily.            CONTINUE taking these medications      Cholecalciferol 125 MCG (5000 UT) Tabs  Commonly known as: VITAMIN D-3     everolimus 5 MG Tabs     losartan 100 MG Tabs  Commonly known as: Cozaar     pantoprazole 40 MG Tbec  Commonly known as: Protonix     pregabalin 100 MG Caps  Commonly known as: Lyrica     simvastatin 40 MG Tabs  Commonly known as: Zocor            STOP taking these medications      HYDROcodone-acetaminophen  MG Tabs  Commonly known as: Norco            ASK your doctor about these medications      Farxiga 10 MG Tabs  Generic drug: dapagliflozin     insulin aspart 100 UNIT/ML Soct  Commonly known as: NovoLOG     Janumet XR  MG Tb24  Generic drug: SITagliptin-metFORMIN HCl ER     pioglitazone 30 MG Tabs  Commonly known as: Actos     Tresiba 100 UNIT/ML Soln  Generic drug: insulin degludec               Where to Get Your Medications        These medications were sent to [id number] [place], [place] - [address] AT [facility] [address], [phone], [phone]  [address] [facility] [address]      Phone: [phone]   cetirizine 10 MG Tabs  fluticasone propionate 50 MCG/ACT Susp         Activity: activity as tolerated  Diet: regular diet  Wound Care: NA  Code Status: No Order          Important follow up:   Follow-up Information       Eulalio Hernandez MD (I). Schedule an appointment as soon as possible for a visit.    Specialty: Internal Medicine  Why: Diabetes follow up within 1-3 weeks  Contact  information:  [address] [facility] [id number] [phone]               Emily Nassar MD Follow up in 1 week(s).    Specialty: ENDOCRINOLOGY  Contact information:  [address] [facility] [address]  [facility] [id number] [phone]                             -PCP in [] within 7 days [] within 14 days [] other     Disposition: home  Discharged Condition: good    Hospital Discharge Diagnoses:  hypoglycemia    Lace+ Score: 45  59-90 High Risk  29-58 Medium Risk  0-28   Low Risk.    TCM Follow-Up Recommendation:  LACE > 58: High Risk of readmission after discharge from the hospital.            Total Time Coordinating Care: Greater than 30 minutes    Patient had opportunity to ask questions, state understanding, and agree with therapeutic plan as outlined    Omkar Varela MD  Hospitalist  4/2/2024

## 2024-04-02 NOTE — PROGRESS NOTES
[facility]  part of [facility]    Progress Note    Stacy Pal Patient Status:  Observation    1940 MRN [id number]   Location [facility] 5SW/SE Attending Omkar Varela MD   Hosp Day # 0 PCP Eulalio Hernandez MD     Chief Complaint:   Chief Complaint   Patient presents with    Hypoglycemia       Subjective:   Stacy Pal is feeling better. She denies SOB, she has some rhinorrhea and postnasal drip causing some mild cough. No F/C. She is up walking with a walker. She wants to go home. Refuses to go to [facility] even if needed. No abd pain no diarrhea   Pt says she has been getting oral diabetes meds and long acting insulin and mealtime insulin - she says her primary has changed her dosing several times where she gets confused on which to take.     Objective:   Objective:    Blood pressure 126/58, pulse 83, temperature 98.1 °F (36.7 °C), temperature source Oral, resp. rate 18, height 5' 5\" (1.651 m), weight 198 lb 9.6 oz (90.1 kg), SpO2 94%.    Physical Exam:    General: No acute distress.   Respiratory: Diminished bases   Cardiovascular: S1, S2. Regular rate and rhythm. No murmurs, rubs or gallops.   Abdomen: Soft, nontender, nondistended.  Positive bowel sounds. No rebound or guarding.  Neurologic: No focal neurological deficits.   Musculoskeletal: Moves all extremities.  Extremities: No edema.      Results:   Results:    Labs:  Recent Labs   Lab 24   WBC 10.5 10.0 11.1*   HGB 10.3* 9.4* 9.6*   MCV 72.8* 74.6* 74.0*   .0 362.0 349.0       Recent Labs   Lab 24   GLU 83 88 155*   BUN 26* 13 14   CREATSERUM 0.72 0.55 0.67   CA 9.2 9.0 8.8   ALB 4.3  --   --     143 143   K 3.9 3.6 3.7    110 110   CO2 22.0 28.0 27.0   ALKPHO 75  --   --    AST 24  --   --    ALT 16  --   --    BILT 0.3  --   --    TP 7.1  --   --        Estimated Creatinine Clearance: 56.2  mL/min (based on SCr of 0.67 mg/dL).    No results for input(s): \"PTP\", \"INR\" in the last 168 hours.         Culture:  Hospital Encounter on 03/29/24   1. Urine Culture, Routine     Status: Abnormal    Collection Time: 03/29/24 10:24 PM    Specimen: Urine, clean catch   Result Value Ref Range    Urine Culture <10,000 CFU/ML Gram Negative Jose (A) N/A       Cardiac  No results for input(s): \"TROP\", \"PBNP\" in the last 168 hours.      Imaging: Imaging data reviewed in [facility].  XR CHEST AP PORTABLE  (CPT=71045)    Result Date: 4/1/2024  CONCLUSION:   Small patchy bibasilar airspace opacities, which may reflect atelectasis or pneumonia.     Dictated by (CST): Mustapha Richardson MD on 4/01/2024 at 12:39 PM     Finalized by (CST): Mustapha Richardson MD on 4/01/2024 at 12:41 PM           Medications:    heparin  5,000 Units Subcutaneous Q8H LOTTIE    cholecalciferol  5,000 Units Oral Daily    losartan  100 mg Oral Daily    pantoprazole  40 mg Oral QAM AC    pregabalin  100 mg Oral BID    atorvastatin  20 mg Oral Nightly    everolimus  5 mg Oral Q24H    insulin aspart  1-7 Units Subcutaneous TID CC and HS    insulin aspart  3 Units Subcutaneous TID CC    insulin degludec  10 Units Subcutaneous Nightly         Assessment and Plan:   Assessment & Plan:      Hypoglycemia, resolved  -If recurs, treatment per protocol  - endocrine on consult  - Family concerned about pt self medicating      IDDM, uncontrolled  - A1c 8.0  - endocrine on consult  - cont SQ insulin regimen per endocrine on consult   - Plans for continuous glucose monitor on discharge to help detect hypoglycemia episodes if present.   - per endocrine no insulin needed on discharge. Will plan for oral meds.      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   - C.diff negative.   - no diarrhea since admission. Abd pain resolved.      Atelectasis   - CXR atelectasis.   - BNP low   - IS    - afebrile no hypoxia     Nonallergic rhinitis   Postnasal drip   - zyrtec  - flonase   - PCT normal. Afebrile.   - afebrile.      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     Plans for home with [facility] today. Cleared PT/OT. Pt is A&O x 3 and competent to make her own decisions she refuses JOAQUIM even if needed. She is agreeable to home CGM. Long discussion with family yesterday - SW to assist with senior services at home. Family concerned about pt overmedicating at home - discussed options of family administering medication (however they live 1 hour away) vs hiring a caregiver to give medicine vs Assisted living. Needs more assistance at home. Once SW assists with safe discharge plan will plan for home today     >55min spent, >50% spent counseling and coordinating care in the form of educating pt/family and d/w consultants and staff. Most of the time spent discussing the above plan.        Plan of care discussed with patient or family at bedside.    Omkar Varela MD  Hospitalist          Supplementary Documentation:     Quality:  DVT Prophylaxis: heparin   CODE status: Full  Dispo: per clinical course           Estimated date of discharge: TBD  Discharge is dependent on: clinical stability  At this point Ms. Pal is expected to be discharge to: JOAQUIM

## 2024-04-02 NOTE — CM/SW NOTE
04/02/24 1100   Discharge disposition   Expected discharge disposition Home-Health   Post Acute Care Provider   ([facility])   Home services after discharge Senior services   Discharge transportation Private car     RALPH confirmed with RN Azalea and Dr. Varela who stated pt is medically ready for discharge today.  Pt has a discharge order.    HH orders/clinicals attached them via Aidin to [facility]. Cecile from [facility] made aware of discharge through phone call/ Aidin Messages.    RALPH confirmed that [facility] contact information added to AVS.    Pt current with [facility] homemaker services.    PLAN[facility]    Courtney Ansari MSW, LSW [phone]

## 2024-04-02 NOTE — PROGRESS NOTES
Calling pt cell to schedule DM apt    Mary(I), MD Eulalio   [place] [id number] [facility] [address] [phone]   Apt made:  Mon 04/15 @1:00pm  Confirmed w/pt  Closing encounter

## 2024-04-02 NOTE — PROGRESS NOTES
Calling pt sonJim to schedule DM apt     Mary(I), MD Eulalio   [place] [id number] [facility] [address] [phone]   Unable to contact pt sonJim (vm full)

## 2024-04-02 NOTE — DIABETES ED
Returned to follow up with patient per SW request.  No family in room at this time.  Patient indicates family will be arriving after 2 pm.  Patient states family member are ordering a device to monitor her blood sugar  at home.

## 2024-04-02 NOTE — PLAN OF CARE
Problem: Patient Centered Care  Goal: Patient preferences are identified and integrated in the patient's plan of care  Description: Interventions:  - What would you like us to know as we care for you? I am from [facility], I like to cook  - Provide timely, complete, and accurate information to patient/family  - Incorporate patient and family knowledge, values, beliefs, and cultural backgrounds into the planning and delivery of care  - Encourage patient/family to participate in care and decision-making at the level they choose  - Honor patient and family perspectives and choices  Outcome: Adequate for Discharge     Problem: Diabetes/Glucose Control  Goal: Glucose maintained within prescribed range  Description: INTERVENTIONS:  - Monitor Blood Glucose as ordered  - Assess for signs and symptoms of hyperglycemia and hypoglycemia  - Administer ordered medications to maintain glucose within target range  - Assess barriers to adequate nutritional intake and initiate nutrition consult as needed  - Instruct patient on self management of diabetes  Outcome: Adequate for Discharge     Problem: Patient/Family Goals  Goal: Patient/Family Long Term Goal  Description: Patient's Long Term Goal: get diabetic med clarification    Interventions:  - endocrine consult   - clear discharge instructions  -family involvement   - See additional Care Plan goals for specific interventions  Outcome: Adequate for Discharge  Goal: Patient/Family Short Term Goal  Description: Patient's Short Term Goal: get stronger    Interventions:   - pt/ot  -up to chair and bathroom   -increase oral intake  - See additional Care Plan goals for specific interventions  Outcome: Adequate for Discharge     Problem: PAIN - ADULT  Goal: Verbalizes/displays adequate comfort level or patient's stated pain goal  Description: INTERVENTIONS:  - Encourage pt to monitor pain and request assistance  - Assess pain using appropriate pain scale  - Administer analgesics based on  type and severity of pain and evaluate response  - Implement non-pharmacological measures as appropriate and evaluate response  - Consider cultural and social influences on pain and pain management  - Manage/alleviate anxiety  - Utilize distraction and/or relaxation techniques  - Monitor for opioid side effects  - Notify MD/LIP if interventions unsuccessful or patient reports new pain  - Anticipate increased pain with activity and pre-medicate as appropriate  Outcome: Adequate for Discharge     Problem: RISK FOR INFECTION - ADULT  Goal: Absence of fever/infection during anticipated neutropenic period  Description: INTERVENTIONS  - Monitor WBC  - Administer growth factors as ordered  - Implement neutropenic guidelines  Outcome: Adequate for Discharge     Problem: SAFETY ADULT - FALL  Goal: Free from fall injury  Description: INTERVENTIONS:  - Assess pt frequently for physical needs  - Identify cognitive and physical deficits and behaviors that affect risk of falls.  - [place] fall precautions as indicated by assessment.  - Educate pt/family on patient safety including physical limitations  - Instruct pt to call for assistance with activity based on assessment  - Modify environment to reduce risk of injury  - Provide assistive devices as appropriate  - Consider OT/PT consult to assist with strengthening/mobility  - Encourage toileting schedule  Outcome: Adequate for Discharge     Problem: DISCHARGE PLANNING  Goal: Discharge to home or other facility with appropriate resources  Description: INTERVENTIONS:  - Identify barriers to discharge w/pt and caregiver  - Include patient/family/discharge partner in discharge planning  - Arrange for needed discharge resources and transportation as appropriate  - Identify discharge learning needs (meds, wound care, etc)  - Arrange for interpreters to assist at discharge as needed  - Consider post-discharge preferences of patient/family/discharge partner  - Complete POLST form as  appropriate  - Assess patient's ability to be responsible for managing their own health  - Refer to Case Management Department for coordinating discharge planning if the patient needs post-hospital services based on physician/LIP order or complex needs related to functional status, cognitive ability or social support system  Outcome: Adequate for Discharge   Safety precautions on place. IV removed. AV's discussed with patient and family, also  and doctors spoke with family and clarify doubts and questions. All her belongings gathered and send it with patient. Pt transported downstairs by me on wheelchair.

## 2024-04-02 NOTE — PLAN OF CARE
Problem: Patient Centered Care  Goal: Patient preferences are identified and integrated in the patient's plan of care  Description: Interventions:  - What would you like us to know as we care for you? I am from [place], I like to cook  - Provide timely, complete, and accurate information to patient/family  - Incorporate patient and family knowledge, values, beliefs, and cultural backgrounds into the planning and delivery of care  - Encourage patient/family to participate in care and decision-making at the level they choose  - Honor patient and family perspectives and choices  Outcome: Progressing     Problem: Diabetes/Glucose Control  Goal: Glucose maintained within prescribed range  Description: INTERVENTIONS:  - Monitor Blood Glucose as ordered  - Assess for signs and symptoms of hyperglycemia and hypoglycemia  - Administer ordered medications to maintain glucose within target range  - Assess barriers to adequate nutritional intake and initiate nutrition consult as needed  - Instruct patient on self management of diabetes  Outcome: Progressing     Problem: Patient/Family Goals  Goal: Patient/Family Long Term Goal  Description: Patient's Long Term Goal: get diabetic med clarification    Interventions:  - endocrine consult   - clear discharge instructions  -family involvement   - See additional Care Plan goals for specific interventions  Outcome: Progressing  Goal: Patient/Family Short Term Goal  Description: Patient's Short Term Goal: get stronger    Interventions:   - pt/ot  -up to chair and bathroom   -increase oral intake  - See additional Care Plan goals for specific interventions  Outcome: Progressing     Problem: PAIN - ADULT  Goal: Verbalizes/displays adequate comfort level or patient's stated pain goal  Description: INTERVENTIONS:  - Encourage pt to monitor pain and request assistance  - Assess pain using appropriate pain scale  - Administer analgesics based on type and severity of pain and evaluate  response  - Implement non-pharmacological measures as appropriate and evaluate response  - Consider cultural and social influences on pain and pain management  - Manage/alleviate anxiety  - Utilize distraction and/or relaxation techniques  - Monitor for opioid side effects  - Notify MD/LIP if interventions unsuccessful or patient reports new pain  - Anticipate increased pain with activity and pre-medicate as appropriate  Outcome: Progressing     Problem: RISK FOR INFECTION - ADULT  Goal: Absence of fever/infection during anticipated neutropenic period  Description: INTERVENTIONS  - Monitor WBC  - Administer growth factors as ordered  - Implement neutropenic guidelines  Outcome: Progressing     Problem: SAFETY ADULT - FALL  Goal: Free from fall injury  Description: INTERVENTIONS:  - Assess pt frequently for physical needs  - Identify cognitive and physical deficits and behaviors that affect risk of falls.  - [place] fall precautions as indicated by assessment.  - Educate pt/family on patient safety including physical limitations  - Instruct pt to call for assistance with activity based on assessment  - Modify environment to reduce risk of injury  - Provide assistive devices as appropriate  - Consider OT/PT consult to assist with strengthening/mobility  - Encourage toileting schedule  Outcome: Progressing     Problem: DISCHARGE PLANNING  Goal: Discharge to home or other facility with appropriate resources  Description: INTERVENTIONS:  - Identify barriers to discharge w/pt and caregiver  - Include patient/family/discharge partner in discharge planning  - Arrange for needed discharge resources and transportation as appropriate  - Identify discharge learning needs (meds, wound care, etc)  - Arrange for interpreters to assist at discharge as needed  - Consider post-discharge preferences of patient/family/discharge partner  - Complete POLST form as appropriate  - Assess patient's ability to be responsible for managing  their own health  - Refer to Case Management Department for coordinating discharge planning if the patient needs post-hospital services based on physician/LIP order or complex needs related to functional status, cognitive ability or social support system  Outcome: Progressing     Pt alert and oriented. Ambulates with one assist and the walker. ACHS. On remote tele. Heparin for DVT prophylaxis. Tylenol for pain PRN. Tolerating diet. Call light within reach. Bed in lowest and locked position. Plan for home when cleared vs family admit about rehab.     *pt is stating she has pneumonia; atelectasis was shown and incentive spirometry was encouraged.

## 2024-04-03 NOTE — TELEPHONE ENCOUNTER
Hi!  Can we contact patient's daughter in law (Barb Niño) and set up follow up appointment for patient in 1 week? Thank you!

## 2024-04-11 ENCOUNTER — OFFICE VISIT (OUTPATIENT)
Dept: ENDOCRINOLOGY CLINIC | Facility: CLINIC | Age: 84
End: 2024-04-11

## 2024-04-11 VITALS
BODY MASS INDEX: 32.49 KG/M2 | HEIGHT: 65 IN | SYSTOLIC BLOOD PRESSURE: 120 MMHG | DIASTOLIC BLOOD PRESSURE: 62 MMHG | HEART RATE: 82 BPM | WEIGHT: 195 LBS | OXYGEN SATURATION: 98 %

## 2024-04-11 DIAGNOSIS — E11.65 TYPE 2 DIABETES MELLITUS WITH HYPERGLYCEMIA, WITHOUT LONG-TERM CURRENT USE OF INSULIN (HCC): Primary | ICD-10-CM

## 2024-04-11 PROCEDURE — 99213 OFFICE O/P EST LOW 20 MIN: CPT | Performed by: INTERNAL MEDICINE

## 2024-04-11 RX ORDER — DAPAGLIFLOZIN 10 MG/1
10 TABLET, FILM COATED ORAL DAILY
Qty: 90 TABLET | Refills: 3 | Status: SHIPPED | OUTPATIENT
Start: 2024-04-11

## 2024-04-11 NOTE — PROGRESS NOTES
Subjective:   Stcay Pal is a 84 year old female who presents for Hospital F/U (Hypoglycemia )     She has come today for review of medications and reiteration of plan.   She currently takes:  Janumet  qAM  Farxiga 10mg PO qAM  Pioglitazone 30mg PO qAM    Fasting- 240, 250, 223, 250  Night- 225, 236 235    History/Other:    Chief Complaint Reviewed and Verified  Nursing Notes Reviewed and   Verified  Tobacco Reviewed  Allergies Reviewed  Medications Reviewed    OB Status Reviewed         Tobacco:  She has never smoked tobacco.    Current Outpatient Medications   Medication Sig Dispense Refill    SITagliptin-metFORMIN HCl ER (JANUMET XR)  MG Oral Tablet 24 Hr Take by mouth daily.      dapagliflozin Propanediol (FARXIGA) 10 MG Oral Tab Take 1 tablet (10 mg total) by mouth daily.      Pioglitazone HCl 30 MG Oral Tab Take 1 tablet (30 mg total) by mouth daily.      Cholecalciferol 125 MCG (5000 UT) Oral Tab Take 1 tablet (5,000 Units total) by mouth daily.      fluticasone propionate 50 MCG/ACT Nasal Suspension 1 spray by Each Nare route daily. 16 mL 0    cetirizine 10 MG Oral Tab Take 1 tablet (10 mg total) by mouth daily for 14 days. 14 tablet 0    pregabalin 100 MG Oral Cap Take 1 capsule (100 mg total) by mouth 2 (two) times daily.      everolimus 5 MG Oral Tab Take 5 mg by mouth See Admin Instructions. **Patient must take medication at 1500 with food**      Pantoprazole Sodium 40 MG Oral Tab EC Take 1 tablet (40 mg total) by mouth every morning before breakfast.      losartan 100 MG Oral Tab Take by mouth daily.      simvastatin 40 MG Oral Tab Take 1 tablet (40 mg total) by mouth nightly.           Review of Systems:  Pertinent items are noted in HPI.    Objective:   /62   Pulse 82   Ht 5' 5\" (1.651 m)   Wt 195 lb (88.5 kg)   SpO2 98%   BMI 32.45 kg/m²  Estimated body mass index is 32.45 kg/m² as calculated from the following:    Height as of this encounter: 5' 5\" (1.651  m).    Weight as of this encounter: 195 lb (88.5 kg).  /62   Pulse 82   Ht 5' 5\" (1.651 m)   Wt 195 lb (88.5 kg)   SpO2 98%   BMI 32.45 kg/m²     General Appearance:  Alert, cooperative, no distress, appears stated age   Head:  Normocephalic, without obvious abnormality, atraumatic   Eyes:  PERRL, conjunctiva/corneas clear, EOM's intact, fundi benign, both eyes   Ears:  Normal TM's and external ear canals, both ears   Nose: Nares normal, septum midline,mucosa normal, no drainage or sinus tenderness   Throat: Lips, mucosa, and tongue normal; teeth and gums normal                               Extremities: Extremities normal, atraumatic, no cyanosis or edema   Pulses: 2+ and symmetric   Skin: Skin color, texture, turgor normal, no rashes or lesions   Lymph nodes: Cervical, supraclavicular, and axillary nodes normal   Neurologic: Normal       Assessment & Plan:   This is a 84 year-old woman with type 2 diabetes, here for follow up of management of type 2 diabetes.     I would like to increase the Janumet 50/1000 to twice daily and continue:  Farxiga 10mg PO qday  Pioglitazone 30mg PO qday    I would like to see her back in 3 months with repeat labs    Prior to this encounter, I spent over 5 minutes with preparing for the visit, including reviewing documents from other specialties as well as from PCP and going over test results and imaging studies. During the face to face encounter, I spent an additional 15 minutes which were determined for follow-up. Greater than 50% of the time was spent in counseling, anticipatory guidance, and coordination of care. Patient concerns were answered to the best of my knowledge.           Emily Nassar MD, 4/11/2024, 4:25 AM

## 2024-04-29 ENCOUNTER — TELEPHONE (OUTPATIENT)
Dept: ENDOCRINOLOGY CLINIC | Facility: CLINIC | Age: 84
End: 2024-04-29

## 2024-04-29 NOTE — TELEPHONE ENCOUNTER
Patient's relative calling states regards concerns of sugars being in the 275s or so after dexcom and patient asking for insulin believes that it is not needed has been controlled. Asking to speak with RN. Please call.

## 2024-04-29 NOTE — TELEPHONE ENCOUNTER
FYI  Last month patient went to the  ER due to bg in the 40's   Pt was taken off insulin and placed on oral hypoglycemics. Daughter in law Celestina reports patient has been doing good on orals.     Patient currently on dexcom placed 20 days ago by home health RN. Patient shares data with Celestina.  FBG in the 200's (per daughter in law)  Over all BG numbers have been in the 200's   Patient is currently having an MRI, patient's phone not available.     This RN provided Celestina the steps for sharing Dexcom data with endo clinic. Celestina was instructed to call the clinic and verify connection,  Once Dexcomdata is available and reviewed, the appropriate recommendations ca be made.  Celestina voiced understanding.

## 2024-05-28 PROBLEM — E11.65 TYPE 2 DIABETES MELLITUS WITH HYPERGLYCEMIA, WITHOUT LONG-TERM CURRENT USE OF INSULIN (HCC): Status: ACTIVE | Noted: 2024-05-28

## 2024-05-29 ENCOUNTER — TELEPHONE (OUTPATIENT)
Dept: ENDOCRINOLOGY CLINIC | Facility: CLINIC | Age: 84
End: 2024-05-29

## 2024-05-29 DIAGNOSIS — Z78.9 WEIGHT GAIN ADVISED: Primary | ICD-10-CM

## 2024-05-29 DIAGNOSIS — E11.65 TYPE 2 DIABETES MELLITUS WITH HYPERGLYCEMIA, WITHOUT LONG-TERM CURRENT USE OF INSULIN (HCC): ICD-10-CM

## 2024-05-29 NOTE — TELEPHONE ENCOUNTER
Patient relative calling.  She states patient started Glipizide a week ago and Blood Sugar levels are better.  Patient saw Oncologist yesterday and he was concerned with her weight loss.  He wants her to see a dietician.  Patient will need a recommendation for a dietician, if Dr Nassar agree's.  Please call

## 2024-05-29 NOTE — TELEPHONE ENCOUNTER
Received fax from [facility]. Attached is pt's office visit note from 5/28/2024.Form placed in provider folder for review and signature.

## 2024-06-11 NOTE — TELEPHONE ENCOUNTER
Spoke to Soni from [facility] - requesting [facility] referral for medical nutrition therapy and CGM personal - referral placed

## 2024-06-11 NOTE — TELEPHONE ENCOUNTER
Called Barb (mother in law) and provided number to schedule visit with Dietitian.     Per Barb, the patient is currently being seen by medicare nurse but visits will stop soon.     Patient is able to monitor BG with CGM but unable to change sensor (son and Barb educating her to self apply)    Barb reports she continues to loose weight. She said that she has worked with Dietitians in the past but stubborn.

## 2024-06-20 ENCOUNTER — TELEPHONE (OUTPATIENT)
Dept: ENDOCRINOLOGY CLINIC | Facility: CLINIC | Age: 84
End: 2024-06-20

## 2024-06-20 NOTE — TELEPHONE ENCOUNTER
Received a fax of patients most recent eye exam dated on 06/19/2024 with Dr. Harrison Harkins at [facility]. Eye exam was documented in patient's 'Diabetes Flowsheet' and placed in providers folder for review.

## 2024-06-24 ENCOUNTER — TELEPHONE (OUTPATIENT)
Dept: ENDOCRINOLOGY | Facility: HOSPITAL | Age: 84
End: 2024-06-24

## 2024-06-24 ENCOUNTER — HOSPITAL ENCOUNTER (OUTPATIENT)
Dept: ENDOCRINOLOGY | Facility: HOSPITAL | Age: 84
End: 2024-06-24
Attending: INTERNAL MEDICINE

## 2024-06-24 DIAGNOSIS — E11.65 TYPE 2 DIABETES MELLITUS WITH HYPERGLYCEMIA, WITHOUT LONG-TERM CURRENT USE OF INSULIN (HCC): Primary | ICD-10-CM

## 2024-06-24 PROCEDURE — 97802 MEDICAL NUTRITION INDIV IN: CPT

## 2024-06-24 NOTE — PROGRESS NOTES
Medical Nutrition Therapy Assessment    Stacy Pal 1/4/1940 was seen for individual Diabetic Medical Nutrition Therapy:    Date: 6/24/2024   Start time 330 P   End time: 425P    Assessment  Lives alone  Anthropometrics:  Weight: 183# today  Weight 255# in January   Height: 65\"  BMI: 32.45     Current diabetes medications:    Current Outpatient Medications:     glipiZIDE 10 MG Oral Tab, Take 1 tablet (10 mg total) by mouth daily with breakfast., Disp: 90 tablet, Rfl: 0    dapagliflozin (FARXIGA) 10 MG Oral Tab, Take 1 tablet (10 mg total) by mouth daily., Disp: 90 tablet, Rfl: 3    fluticasone propionate 50 MCG/ACT Nasal Suspension, 1 spray by Each Nare route daily., Disp: 16 mL, Rfl: 0    pregabalin 100 MG Oral Cap, Take 1 capsule (100 mg total) by mouth 2 (two) times daily., Disp: , Rfl:     everolimus 5 MG Oral Tab, Take 5 mg by mouth See Admin Instructions. **Patient must take medication at 1500 with food**, Disp: , Rfl:     SITagliptin-metFORMIN HCl ER (JANUMET XR)  MG Oral Tablet 24 Hr, Take by mouth daily., Disp: , Rfl:     Pioglitazone HCl 30 MG Oral Tab, Take 1 tablet (30 mg total) by mouth daily., Disp: , Rfl:     Pantoprazole Sodium 40 MG Oral Tab EC, Take 1 tablet (40 mg total) by mouth every morning before breakfast., Disp: , Rfl:     losartan 100 MG Oral Tab, Take by mouth daily., Disp: , Rfl:     simvastatin 40 MG Oral Tab, Take 1 tablet (40 mg total) by mouth nightly., Disp: , Rfl:     Cholecalciferol 125 MCG (5000 UT) Oral Tab, Take 1 tablet (5,000 Units total) by mouth daily., Disp: , Rfl:     Reports taking meds as prescribed, will dig deeper into this again next visit    Labs:  Lab Results   Component Value Date    A1C 8.0 (H) 03/30/2024    A1C 7.5 (H) 10/03/2020    BUN 14 04/02/2024    BUN 13 04/01/2024    CREATSERUM 0.67 04/02/2024    CREATSERUM 0.55 04/01/2024    GFRNAA 86 10/04/2020    GFRNAA 70 10/03/2020    GFRAA 99 10/04/2020    GFRAA 81 10/03/2020       SMBG at home: Using  Dexcom CGM   Since Jun 11, GMI 8.3 average sugar 208 with SD of 24  86% high, 10% in range    Diet Hx:  Diet Recall:   Limited taste, lower appetite but not as low as prior  Trying to get on a schedule  Taking 2 creon with meals, 1 with snack  No loose or floating stools  Oatmeal - cereal  Nectarine  Spinach Pies - Eggs - Hot Pockets, homemade, chicken slice thin (1 piece) (Afternoon)  Nectarine  Chicken, Half Cup Rice (7p)  Coffee - no sugar, milk (half and half), water  Sleep schedule prior to hospital was interesting, wake up earlier now 8a, eats early -- bedtime 1015/130p  Wt: 6 months ago 255#, now 181# per patient     Lives on her own, has several gardens  Constantly on the go    Physical Activity: Using walker, was in PT    Nutrition Diagnosis  Food/nutrition related knowledge deficit related to healthy eating as evidenced by request for informaton on weight maintenance without raising sugars    Intervention  Comprehensive Nutrition Education Provided:     [] discussed healthy weight management, glucose management, lipid management, and BP management as related to diabetes   [] discussed basic meal planning guidelines for diabetes regular mealtime, limited concentrated sweets. Worked on establishing eating pattern/timing of meals and snacks    [] discussed in depth meal planning using the healthy eating with diabetes plate method with focus on balanced macronutrient consumption, including identifying foods that are carbohydrates, lean protein, non-starchy vegetables, and heart healthy fats   [] stressed importance of carbohydrate consistency in each meal   [x] recommended carbohydrate targets of 45 grams at meals and 20 grams at snacks   [] educated on label reading   [x] educated on portion control; including use of measuring cups, spoons, or food scale   [x] provided suggestions for lower carb, healthy snacks   [] educated on importance of food monitoring and how to use food logs   [] discussed how to  handle special occasions, dining out, and eating on the go   [] discussed menu planning, healthy food shopping, cooking tips, and need to pre prepare foods    [] educated on emotional eating and being mindful at meals   []  reviewed other behavior modification strategies    [x]emphasized the need for small, sustainable changes and working on SMART goals to encourage sustainable behaviors    [] instructions on how to use helpful websites or nutrition/tracking apps reviewed    [] taught to use carbohydrate to insulin ratio and insulin sensitivity factor    [] discussed barriers and overcoming barriers to best achieve meal planning goals    [] discussed Mediterranean diet/DASH diet, as appropriate, to address lipids or BP   [] reviewed renal diet components and how to incorporate this with diabetes meal planning         Monitoring  diet modification/understanding, blood sugars, hemoglobin A1c, and weight trends    Evaluation  Behavioral Goal(s) selected:   Healthy Eating    Support Plan:  The use of Diabetes Websites or Apps    Plan  Blood sugar goals: less than 130 mg/dl in the morning and less than 180 mg/dl 2 hours after meals.  Keep glucose tabs or fast acting carbohydrates with you for treatment of lows; for blood glucose levels less than 70 mg/dl, take 15 grams of fast acting carbohydrates (3-4 glucose tabs, 4 ounces of juice, or as discussed). Retest in 15 min. If not above 70 mg/dl, retreat.   Call Ana Luisa Barrientos RD at [phone] (option 3) for problems/concerns.   1-2 months  Ana Luisa Barrientos RD

## 2024-06-24 NOTE — TELEPHONE ENCOUNTER
Patient is asking about a Dexcom G7 for ease of placement when she loses her Medicare home RN. Can you please rx this? Helen can you please follow up to ensure pt received this?

## 2024-06-24 NOTE — ADDENDUM NOTE
Encounter addended by: Ana Luisa Barrientos RD on: 6/24/2024 5:04 PM   Actions taken: Clinical Note Signed

## 2024-06-24 NOTE — PATIENT INSTRUCTIONS
[email or web address] -- benacalorie samples  Consider premier protein or benecalorie.     Focus on small frequent meals and higher fat/protein foods, as discussed.

## 2024-06-25 NOTE — TELEPHONE ENCOUNTER
Hi!    I will prescribe the Dexcom. Please find out which Dexcom she is currently using and put in prescription. Thank you!

## 2024-06-26 ENCOUNTER — TELEPHONE (OUTPATIENT)
Dept: ENDOCRINOLOGY | Facility: HOSPITAL | Age: 84
End: 2024-06-26

## 2024-06-26 DIAGNOSIS — E11.65 TYPE 2 DIABETES MELLITUS WITH HYPERGLYCEMIA, WITHOUT LONG-TERM CURRENT USE OF INSULIN (HCC): Primary | ICD-10-CM

## 2024-06-26 RX ORDER — ACYCLOVIR 400 MG/1
1 TABLET ORAL
Qty: 3 EACH | Refills: 11 | Status: SHIPPED | OUTPATIENT
Start: 2024-06-26 | End: 2025-06-26

## 2024-06-26 RX ORDER — ACYCLOVIR 400 MG/1
1 TABLET ORAL DAILY
Qty: 1 EACH | Refills: 0 | Status: SHIPPED | OUTPATIENT
Start: 2024-06-26

## 2024-06-27 NOTE — TELEPHONE ENCOUNTER
Called pt and notified that Dexcom sensors were sent to pharmacy. Pt verbalized understanding. Advised pt call pharmacy prior to picking up.

## 2024-07-01 ENCOUNTER — TELEPHONE (OUTPATIENT)
Dept: ENDOCRINOLOGY | Facility: HOSPITAL | Age: 84
End: 2024-07-01

## 2024-07-01 NOTE — TELEPHONE ENCOUNTER
Consent Verification   Assessment completed with: Patient   HIPPA verified? Yes    General: Introduction of Diabetes Navigator services was done. Contact information given to patient.    Lab Results   Component Value Date    A1C 8.0 (H) 03/30/2024    A1C 7.5 (H) 10/03/2020      Medication Adherence: Navigator called patient to review medications. Per patient has been adherent with prescribed medications as directed. No side effects or concerns. Navigator reviewed indications and instructions on Farxiga 10mg. Navigator encourage patient to call with any questions or concerns. Patient verbalized understanding all questions answered.     Monitoring: Navigator explained the importance to check blood sugar and maintain a daily log. Patient confirm she is currently using the Dexcom G7 sensor.     Appointments:   Endocrinology: 07/23/24 11:45am Dr. Nassar  Diabetes Education: 08/12/24 11:30 am Em Barrientos  Neurosurgery: 11/12/24 11:15am Dr. Carias      Plan: follow up

## 2024-07-23 ENCOUNTER — OFFICE VISIT (OUTPATIENT)
Dept: ENDOCRINOLOGY CLINIC | Facility: CLINIC | Age: 84
End: 2024-07-23

## 2024-07-23 VITALS — BODY MASS INDEX: 30.22 KG/M2 | HEIGHT: 64 IN | WEIGHT: 177 LBS

## 2024-07-23 DIAGNOSIS — E11.65 TYPE 2 DIABETES MELLITUS WITH HYPERGLYCEMIA, WITHOUT LONG-TERM CURRENT USE OF INSULIN (HCC): Primary | ICD-10-CM

## 2024-07-23 DIAGNOSIS — E55.9 VITAMIN D DEFICIENCY: ICD-10-CM

## 2024-07-23 DIAGNOSIS — E11.65 TYPE 2 DIABETES MELLITUS WITH HYPERGLYCEMIA, WITHOUT LONG-TERM CURRENT USE OF INSULIN (HCC): ICD-10-CM

## 2024-07-23 LAB
CARTRIDGE EXPIRATION DATE: ABNORMAL DATE
GLUCOSE BLOOD: 227
HEMOGLOBIN A1C: 8.7 % (ref 4.3–5.6)
TEST STRIP EXPIRATION DATE: NORMAL DATE
TEST STRIP LOT #: NORMAL NUMERIC

## 2024-07-23 PROCEDURE — 82947 ASSAY GLUCOSE BLOOD QUANT: CPT | Performed by: INTERNAL MEDICINE

## 2024-07-23 PROCEDURE — 83036 HEMOGLOBIN GLYCOSYLATED A1C: CPT | Performed by: INTERNAL MEDICINE

## 2024-07-23 PROCEDURE — 99214 OFFICE O/P EST MOD 30 MIN: CPT | Performed by: INTERNAL MEDICINE

## 2024-07-23 RX ORDER — GLIPIZIDE 5 MG/1
5 TABLET ORAL
Qty: 180 TABLET | Refills: 0 | Status: SHIPPED | OUTPATIENT
Start: 2024-07-23 | End: 2024-10-21

## 2024-07-23 NOTE — TELEPHONE ENCOUNTER
Daughter in law is requesting for Janumet,  farxiga 10 mg, piogliizone 30 mg please follow up and send to [facility] - [place], [place] - [address] [phone], [phone]

## 2024-07-23 NOTE — PROGRESS NOTES
Subjective:   Stacy Pal is a 84 year old female who presents for Diabetes (Follow ) At the last visit, I had increased her Janumet 50/1000 to twice daily. After this, she had started to have elevated blood sugars and so we started her on Glipizide 10mg PO qAM. She comes in follow-up. She has had one episode of hypoglycemia since then, in the 70s.     She has come today for follow-up of type 2 diabetes.   She currently takes:  Janumet  PO bid  Farxiga 10mg PO qAM  Pioglitazone 30mg PO qAM  Glipizide 10mg PO qAM    Checking blood sugars 4-5 times a day with the Dexcom:  Very High- 14%  High- 80%  In Range- 6%  Low and Very Low- 0%    History/Other:    Chief Complaint Reviewed and Verified  Nursing Notes Reviewed and   Verified  Allergies Reviewed  Medications Reviewed         Tobacco:  She has never smoked tobacco.    Current Outpatient Medications   Medication Sig Dispense Refill    glipiZIDE 10 MG Oral Tab Take 1 tablet (10 mg total) by mouth daily with breakfast. 90 tablet 0    dapagliflozin (FARXIGA) 10 MG Oral Tab Take 1 tablet (10 mg total) by mouth daily. 90 tablet 3    everolimus 5 MG Oral Tab Take 5 mg by mouth See Admin Instructions. **Patient must take medication at 1500 with food**      Pioglitazone HCl 30 MG Oral Tab Take 1 tablet (30 mg total) by mouth daily.      Pantoprazole Sodium 40 MG Oral Tab EC Take 1 tablet (40 mg total) by mouth every morning before breakfast.      losartan 100 MG Oral Tab Take by mouth daily.      simvastatin 40 MG Oral Tab Take 1 tablet (40 mg total) by mouth nightly.      Cholecalciferol 125 MCG (5000 UT) Oral Tab Take 1 tablet (5,000 Units total) by mouth daily.      Continuous Glucose Sensor (DEXCOM G7 SENSOR) Does not apply Misc 1 each Every 10 days. Use as directed every 10 days 3 each 11    Continuous Glucose  (DEXCOM G7 ) Does not apply Device 1 each daily. 1 each 0    fluticasone propionate 50 MCG/ACT Nasal Suspension 1 spray by  Each Nare route daily. 16 mL 0    pregabalin 100 MG Oral Cap Take 1 capsule (100 mg total) by mouth 2 (two) times daily.      SITagliptin-metFORMIN HCl ER (JANUMET XR)  MG Oral Tablet 24 Hr Take 50-1,000 mg by mouth in the morning and 50-1,000 mg before bedtime. I tablet in the morning 1 tablet at night .           Review of Systems:  Pertinent items are noted in HPI.    Objective:   Ht 5' 4\" (1.626 m)   Wt 177 lb (80.3 kg)   BMI 30.38 kg/m²  Estimated body mass index is 30.38 kg/m² as calculated from the following:    Height as of this encounter: 5' 4\" (1.626 m).    Weight as of this encounter: 177 lb (80.3 kg).  Ht 5' 4\" (1.626 m)   Wt 177 lb (80.3 kg)   BMI 30.38 kg/m²     General Appearance:  Alert, cooperative, no distress, appears stated age   Head:  Normocephalic, without obvious abnormality, atraumatic   Eyes:  PERRL, conjunctiva/corneas clear, EOM's intact, fundi benign, both eyes   Ears:  Normal TM's and external ear canals, both ears   Nose: Nares normal, septum midline,mucosa normal, no drainage or sinus tenderness   Throat: Lips, mucosa, and tongue normal; teeth and gums normal                               Extremities: Extremities normal, atraumatic, no cyanosis or edema   Pulses: 2+ and symmetric   Skin: Skin color, texture, turgor normal, no rashes or lesions   Lymph nodes: Cervical, supraclavicular, and axillary nodes normal   Neurologic: Normal       Assessment & Plan:   This is a 84 year-old woman with type 2 diabetes, here for follow up of management of type 2 diabetes.     I would like to continue the Janumet 50/1000 PO bid; Farxiga 10mg PO qday; Pioglitazone 30mg PO qday  - I would like to decrease the daytime glipizide to 5mg and start her on 5mg glipizide to be taken in the evening  - I have given her a handout so that she now knows what to do when blood sugars go low.     I would like to see her back in 3 months with repeat labs    Prior to this encounter, I spent over 15 minutes  with preparing for the visit, including reviewing documents from other specialties as well as from PCP and going over test results and imaging studies. During the face to face encounter, I spent an additional 15 minutes which were determined for follow-up. Greater than 50% of the time was spent in counseling, anticipatory guidance, and coordination of care. Patient concerns were answered to the best of my knowledge.       Emily Nassar MD  7/23/2024

## 2024-07-24 NOTE — TELEPHONE ENCOUNTER
Endocrine Refill protocol for oral and injectable diabetic medications    Protocol Criteria:  FAILED A1c not at goal     -Appointment with Endocrinology completed in the last 6 months or scheduled in the next 3 months    -A1c result below 8.5% in the past 6 months      Verify the above has been completed or scheduled in the appropriate timeline. If so can send a 90 day supply with 1 refill.     Last completed office visit: 7/23/2024 Emily Nassar MD   Next scheduled Follow up:   Future Appointments   Date Time Provider Department Center   8/12/2024 11:30 AM Ana Luisa Barrientos RD [facility]   10/29/2024 11:15 AM Emily Nassar MD [facility]      Last A1c result: Last A1c value was 8.7% done 7/23/2024.    90 day +1 refill pending   Please approve if applicable        Daughter in law( Barb informed medication request has been received and forwarded to provider for approval, if any additional questions or concerns will reach out, she understood and had no additional questions or concerns at this time.

## 2024-07-26 RX ORDER — DAPAGLIFLOZIN 10 MG/1
10 TABLET, FILM COATED ORAL DAILY
Qty: 90 TABLET | Refills: 1 | Status: SHIPPED | OUTPATIENT
Start: 2024-07-26

## 2024-07-26 RX ORDER — PIOGLITAZONEHYDROCHLORIDE 30 MG/1
30 TABLET ORAL DAILY
Qty: 90 TABLET | Refills: 1 | Status: SHIPPED | OUTPATIENT
Start: 2024-07-26

## 2024-07-26 RX ORDER — SITAGLIPTIN AND METFORMIN HYDROCHLORIDE 1000; 50 MG/1; MG/1
TABLET, FILM COATED, EXTENDED RELEASE ORAL 2 TIMES DAILY
Qty: 180 TABLET | Refills: 1 | Status: SHIPPED | OUTPATIENT
Start: 2024-07-26

## 2024-07-31 ENCOUNTER — TELEPHONE (OUTPATIENT)
Dept: ENDOCRINOLOGY CLINIC | Facility: CLINIC | Age: 84
End: 2024-07-31

## 2024-07-31 NOTE — TELEPHONE ENCOUNTER
Increase Glipizide to 10 mg twice  aday with meals thanks - if getting low BG, decrease to 5 mg   Thanks

## 2024-07-31 NOTE — TELEPHONE ENCOUNTER
Yes, ok to go for labs to evaluate.  It might also be helpful to see RD at [facility].  Ok to place referral if interested in this option.

## 2024-07-31 NOTE — TELEPHONE ENCOUNTER
Patient's relative calling states patient has been losing weight still and fatigue. Wants to ask if patient can complete blood work now. Please call.

## 2024-07-31 NOTE — TELEPHONE ENCOUNTER
Dr. Shah/Dr. Obrien, patient of Dr. Nassar. Family member is concerned about continued weight loss and asking if current pending future labs may be drawn now rather than in 3 months. If OK, we can place new orders with draw date for today. Currently awaiting call back from patient to triage weight loss and elevated sugars seen on dexcom.     LOV 7/23/2024 for type to diabetes. A1C 8.7% on 7/23/24.   I called and spoke with the patient's family member Barb. OK per SATINDER.    Barb states that the patient lives alone. She has a caregiver that comes 22 hours per week and Barb checks in on her weekly as well but does not live with her. Barb is concerned that the patient may have lost some more weight since her last visit last week. In April her weight was 195 lbs and at this most recent visit 7/23 her weight was 177lbs. She is not sure how much weight the patient has lost or if the patient is taking her medication as prescribed. She can see per the patient's dexcom (she is connected) that sugars have been running high. Patient mostly manages her own diet and has a history of non compliance (gave example of the patient eating in the middle of the night and making donuts). She is concerned about the continued weight loss and asking if pending lab orders can be drawn now rather than in 3 months time prior to next follow up.     I called the patient to inquire on her medications. Dexcom report shows sugars in the 300's this afternoon. When patient returns call will cross check sugars with current dexcom report and ask patient (or her family member Barb) to update the spelling of the patient's name in their dexcom yanira.     Will await patient return call to triage hyperglycemia and ask current weight.

## 2024-07-31 NOTE — TELEPHONE ENCOUNTER
Dr. Shah/Dr. Obrien please advised on elevated sugars below. Pt asking if meds need to be adjusted. Thanks.     I called and spoke with the patient. She states that she was 177 lbs at her visit last week and today she is between 173-175 lbs. Advised that she may proceed with lab work now as ordered by Dr. Nassar at her earliest convenience, fasting required. Patient states that she feels her diet is currently ok. See diet information below.     I called the lab. No need to re order the future dated labs. They can draw them when the patient comes in.     She is concerned for elevated sugars and asking if medications need to be adjusted.     Hyperglycemia     Onset of hyperglycemia: since last visit    BG levels: dexcom sugars below    Symptoms: Patient denies nausea, vomiting, abdominal pain, SOB, CP. States she had a bought of diarrhea a few days ago and again this morning. She took OTC loperamide and has had not more watery stools. Has some abdominal cramping but no pain.     Pattern of hyperglycemia: overall elevated.     Steroid therapy: none    Acute Illness: not sick. States had a little diarrhea a few days ago and today. Today she took \"diarrhea pills\" unsure of name.     Change in Diet: no change. Patient states last night for dinner she had meat, salad, and a small baked potato. No snacks overnight. For breakfast she had oatmeal and water. No snacks or meals since then. She has been walking to try and keep her sugar down.    List DM Medications/Compliance:  Janumet 50/100 1000 mg PO BID: taking  Farxiga 10 mg daily: taking  Pioglitazone 30 mg daily: taking  Glipizide 5 mg PO BID: : taking    Called the patient's family member Barb to provide update. TCB. Will also ask her to update the spelling of the patient's name in her dexcom yanira.

## 2024-07-31 NOTE — TELEPHONE ENCOUNTER
Ok, noted. Lets increase Glipizide to 10mg in AM and 5mg in PM.  The rest of the medications continue the same. Thanks.

## 2024-07-31 NOTE — TELEPHONE ENCOUNTER
Given the patient's age and history of one hypoglycemic episode in the past provided her with the most conservative dose adjustment orders.     I called [facility] and advised her to increase glipizide to 10 mg in the morning and continue 5 mg in the evening. Continue to check blood sugars with CGM. Call with BG update on Friday morning. We will potentially increase the glipizide dose further if needed. Call sooner with any sugars less than 80 or persistently > 300.   Continue with the rest of her medications the same.     Patient verbalized her understanding of instructions. I also called her Family Member Barb and updated her on the plan of care.

## 2024-08-01 ENCOUNTER — LAB ENCOUNTER (OUTPATIENT)
Dept: LAB | Facility: HOSPITAL | Age: 84
End: 2024-08-01
Attending: INTERNAL MEDICINE
Payer: MEDICARE

## 2024-08-01 ENCOUNTER — TELEPHONE (OUTPATIENT)
Dept: ENDOCRINOLOGY CLINIC | Facility: CLINIC | Age: 84
End: 2024-08-01

## 2024-08-01 DIAGNOSIS — E55.9 VITAMIN D DEFICIENCY: ICD-10-CM

## 2024-08-01 DIAGNOSIS — E11.65 TYPE 2 DIABETES MELLITUS WITH HYPERGLYCEMIA, WITHOUT LONG-TERM CURRENT USE OF INSULIN (HCC): ICD-10-CM

## 2024-08-01 LAB
ALBUMIN SERPL-MCNC: 4.4 G/DL (ref 3.2–4.8)
ALBUMIN/GLOB SERPL: 1.7 {RATIO} (ref 1–2)
ALP LIVER SERPL-CCNC: 65 U/L
ALT SERPL-CCNC: 11 U/L
ANION GAP SERPL CALC-SCNC: 7 MMOL/L (ref 0–18)
AST SERPL-CCNC: 20 U/L (ref ?–34)
BILIRUB SERPL-MCNC: 0.5 MG/DL (ref 0.2–1.1)
BUN BLD-MCNC: 23 MG/DL (ref 9–23)
BUN/CREAT SERPL: 30.3 (ref 10–20)
CALCIUM BLD-MCNC: 9.6 MG/DL (ref 8.7–10.4)
CHLORIDE SERPL-SCNC: 109 MMOL/L (ref 98–112)
CHOLEST SERPL-MCNC: 123 MG/DL (ref ?–200)
CO2 SERPL-SCNC: 24 MMOL/L (ref 21–32)
CREAT BLD-MCNC: 0.76 MG/DL
CREAT UR-SCNC: 64.4 MG/DL
EGFRCR SERPLBLD CKD-EPI 2021: 77 ML/MIN/1.73M2 (ref 60–?)
FASTING PATIENT LIPID ANSWER: YES
FASTING STATUS PATIENT QL REPORTED: YES
GLOBULIN PLAS-MCNC: 2.6 G/DL (ref 2–3.5)
GLUCOSE BLD-MCNC: 211 MG/DL (ref 70–99)
HDLC SERPL-MCNC: 49 MG/DL (ref 40–59)
LDLC SERPL CALC-MCNC: 51 MG/DL (ref ?–100)
MICROALBUMIN UR-MCNC: 1.3 MG/DL
MICROALBUMIN/CREAT 24H UR-RTO: 20.2 UG/MG (ref ?–30)
NONHDLC SERPL-MCNC: 74 MG/DL (ref ?–130)
OSMOLALITY SERPL CALC.SUM OF ELEC: 300 MOSM/KG (ref 275–295)
POTASSIUM SERPL-SCNC: 3.9 MMOL/L (ref 3.5–5.1)
PROT SERPL-MCNC: 7 G/DL (ref 5.7–8.2)
SODIUM SERPL-SCNC: 140 MMOL/L (ref 136–145)
T4 FREE SERPL-MCNC: 1 NG/DL (ref 0.8–1.7)
TRIGL SERPL-MCNC: 131 MG/DL (ref 30–149)
TSI SER-ACNC: 1.96 MIU/ML (ref 0.55–4.78)
VIT B12 SERPL-MCNC: 764 PG/ML (ref 211–911)
VIT D+METAB SERPL-MCNC: 33 NG/ML (ref 30–100)
VLDLC SERPL CALC-MCNC: 19 MG/DL (ref 0–30)

## 2024-08-01 PROCEDURE — 82306 VITAMIN D 25 HYDROXY: CPT

## 2024-08-01 PROCEDURE — 84443 ASSAY THYROID STIM HORMONE: CPT

## 2024-08-01 PROCEDURE — 80061 LIPID PANEL: CPT

## 2024-08-01 PROCEDURE — 82043 UR ALBUMIN QUANTITATIVE: CPT

## 2024-08-01 PROCEDURE — 36415 COLL VENOUS BLD VENIPUNCTURE: CPT

## 2024-08-01 PROCEDURE — 82570 ASSAY OF URINE CREATININE: CPT

## 2024-08-01 PROCEDURE — 82607 VITAMIN B-12: CPT

## 2024-08-01 PROCEDURE — 84439 ASSAY OF FREE THYROXINE: CPT

## 2024-08-01 PROCEDURE — 80053 COMPREHEN METABOLIC PANEL: CPT

## 2024-08-03 NOTE — TELEPHONE ENCOUNTER
Labs from 8/01/24:    - liver and kidney function are within normal limits  - lipid panel is within normal  - vitamin D level is within normal limits  - vitamin B12 is normal  - thyroid function is also within normal limits.     She may continue on the same treatment. She duong snot have to repeat labs before next appt. Thank you!

## 2024-08-05 NOTE — TELEPHONE ENCOUNTER
Spoke to pt's daughter in law, Barb. Provided results as written below by Dr. Nassar. Verbalized understanding.     Reviewed with her on the importance of including protein + fiber in meals and exercising to help with proper diabetes management and to stabilize sugars. Verbalized understanding.

## 2024-08-05 NOTE — TELEPHONE ENCOUNTER
Left voice message for pt's daughter in law, Carrillo to call back. Sent [facility] message with results written below by Dr. Nassar.

## 2024-08-07 NOTE — TELEPHONE ENCOUNTER
Please see condition update below. Most recent dexcom report will be sent via WebEx. Daughter in Law inquiring if glipizide should be increased to 10 mg BID.     Patient has not called with condition update.   Dexcom downloaded and reviewed. Seems to be some improvement of hyperglycemia  No hypoglycemia noted on report.  Dexcom report downloaded and sent to provider via WebEx for review.     I called the patient to confirm recent medication changes.   Spoke with her daughter in Law Day.   She states patient is taking medications as prescribed.   Meal times often vary, sometimes will sleep in until 11am and will eat dinner later around 8, patient is working on eating at set times and following diabetic meal plan.   Patient feels sugars are improved, no longer in 300's, but still above 200 most of the time.     Confirmed patient is taking:  Janumet 50/100 1000 mg PO BID: taking  Farxiga 10 mg daily: taking  Pioglitazone 30 mg daily: taking  Glipizide 10 mg in the morning and 5 mg in the evening: : taking     On Saturday night 8/3/24 patient took 10 mg of glipizide in the evening. No noted hypoglycemia that night. ABDULLAHI Day is inquiring if glipizide should be increased further. She is requsting a call back directly as she assists the patient with management of her medications.

## 2024-08-12 ENCOUNTER — HOSPITAL ENCOUNTER (OUTPATIENT)
Dept: ENDOCRINOLOGY | Facility: HOSPITAL | Age: 84
End: 2024-08-12
Attending: INTERNAL MEDICINE
Payer: MEDICARE

## 2024-08-12 ENCOUNTER — TELEPHONE (OUTPATIENT)
Dept: ENDOCRINOLOGY | Facility: HOSPITAL | Age: 84
End: 2024-08-12

## 2024-08-12 VITALS — WEIGHT: 174 LBS | BODY MASS INDEX: 30 KG/M2

## 2024-08-12 DIAGNOSIS — E11.65 TYPE 2 DIABETES MELLITUS WITH HYPERGLYCEMIA, WITHOUT LONG-TERM CURRENT USE OF INSULIN (HCC): Primary | ICD-10-CM

## 2024-08-12 PROCEDURE — 97803 MED NUTRITION INDIV SUBSEQ: CPT

## 2024-08-12 NOTE — PROGRESS NOTES
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    Current diabetes medications:    Current Outpatient Medications:     dapagliflozin (FARXIGA) 10 MG Oral Tab, Take 1 tablet (10 mg total) by mouth daily., Disp: 90 tablet, Rfl: 1    SITagliptin-metFORMIN HCl ER (JANUMET XR)  MG Oral Tablet 24 Hr, Take 50-1,000 mg by mouth in the morning and 50-1,000 mg before bedtime. I tablet in the morning 1 tablet at night., Disp: 180 tablet, Rfl: 1    pioglitazone 30 MG Oral Tab, Take 1 tablet (30 mg total) by mouth daily., Disp: 90 tablet, Rfl: 1    glipiZIDE 5 MG Oral Tab, Take 1 tablet (5 mg total) by mouth 2 (two) times daily before meals., Disp: 180 tablet, Rfl: 0    Continuous Glucose Sensor (DEXCOM G7 SENSOR) Does not apply Misc, 1 each Every 10 days. Use as directed every 10 days, Disp: 3 each, Rfl: 11    Continuous Glucose  (DEXCOM G7 ) Does not apply Device, 1 each daily., Disp: 1 each, Rfl: 0    fluticasone propionate 50 MCG/ACT Nasal Suspension, 1 spray by Each Nare route daily., Disp: 16 mL, Rfl: 0    pregabalin 100 MG Oral Cap, Take 1 capsule (100 mg total) by mouth 2 (two) times daily., Disp: , Rfl:     everolimus 5 MG Oral Tab, Take 5 mg by mouth See Admin Instructions. **Patient must take medication at 1500 with food**, Disp: , Rfl:     Pantoprazole  Sodium 40 MG Oral Tab EC, Take 1 tablet (40 mg total) by mouth every morning before breakfast., Disp: , Rfl:     losartan 100 MG Oral Tab, Take by mouth daily., Disp: , Rfl:     simvastatin 40 MG Oral Tab, Take 1 tablet (40 mg total) by mouth nightly., Disp: , Rfl:     Cholecalciferol 125 MCG (5000 UT) Oral Tab, Take 1 tablet (5,000 Units total) by mouth daily., Disp: , Rfl:     Reports taking meds as prescribed  Just increased to 10 mg glipizide BID    Labs:  Lab Results   Component Value Date    A1C 8.7 (A) 07/23/2024    A1C 8.0 (H) 03/30/2024    CHOLEST 123 08/01/2024    LDL 51 08/01/2024    HDL 49 08/01/2024    NONHDLC 74 08/01/2024    TRIG 131 08/01/2024    BUN 23 08/01/2024    BUN 14 04/02/2024    CREATSERUM 0.76 08/01/2024    CREATSERUM 0.67 04/02/2024    GFRNAA 86 10/04/2020    GFRNAA 70 10/03/2020    GFRAA 99 10/04/2020    GFRAA 81 10/03/2020       SMBG at home: Using Dexcom CGM   Since Jun 11, last visit: GMI 8.3 average sugar 208 with SD of 24  86% high, 10% in range    Today's visit: 223  9% time in range, 73% high  0% low  No recent low blood sugars    Diet Hx:  Diet Recall:  Does not like supplements encouraged, did not wait to pay for benecalorie  -Today emphasized fats - avocado, cheese, oils, nuts, nut butters protein - meat, chicken   -emphasized need to take creon with meals    Last visit:  Limited taste, lower appetite but not as low as prior  Trying to get on a schedule  Taking 2 creon with meals, 1 with snack  No loose or floating stools  Oatmeal - cereal  Nectarine  Spinach Pies - Eggs - Hot Pockets, homemade, chicken slice thin (1 piece) (Afternoon)  Nectarine  Chicken, Half Cup Rice (7p)  Coffee - no sugar, milk (half and half), water  Sleep schedule prior to hospital was interesting, wake up earlier now 8a, eats early -- bedtime 1015/130p  Wt: 6 months ago 255#, now 181# per patient     Lives on her own, has several gardens  Constantly on the go    Physical Activity: Using walker, was in  PT    Nutrition Diagnosis  Food/nutrition related knowledge deficit related to healthy eating as evidenced by request for informaton on weight maintenance without raising sugars    Intervention  Comprehensive Nutrition Education Provided:     [] discussed healthy weight management, glucose management, lipid management, and BP management as related to diabetes   [] discussed basic meal planning guidelines for diabetes regular mealtime, limited concentrated sweets. Worked on establishing eating pattern/timing of meals and snacks    [] discussed in depth meal planning using the healthy eating with diabetes plate method with focus on balanced macronutrient consumption, including identifying foods that are carbohydrates, lean protein, non-starchy vegetables, and heart healthy fats   [] stressed importance of carbohydrate consistency in each meal   [x] recommended carbohydrate targets of 45 grams at meals and 20 grams at snacks   [] educated on label reading   [x] educated on portion control; including use of measuring cups, spoons, or food scale   [x] provided suggestions for lower carb, healthy snacks   [] educated on importance of food monitoring and how to use food logs   [] discussed how to handle special occasions, dining out, and eating on the go   [] discussed menu planning, healthy food shopping, cooking tips, and need to pre prepare foods    [] educated on emotional eating and being mindful at meals   []  reviewed other behavior modification strategies    [x]emphasized the need for small, sustainable changes and working on SMART goals to encourage sustainable behaviors    [] instructions on how to use helpful websites or nutrition/tracking apps reviewed    [] taught to use carbohydrate to insulin ratio and insulin sensitivity factor    [] discussed barriers and overcoming barriers to best achieve meal planning goals    [] discussed Mediterranean diet/DASH diet, as appropriate, to address lipids or BP   []  reviewed renal diet components and how to incorporate this with diabetes meal planning         Monitoring  diet modification/understanding, blood sugars, hemoglobin A1c, and weight trends    Evaluation  Behavioral Goal(s) selected:   Healthy Eating    Support Plan:  The use of Diabetes Websites or Apps    Plan  Blood sugar goals: less than 130 mg/dl in the morning and less than 180 mg/dl 2 hours after meals.  Keep glucose tabs or fast acting carbohydrates with you for treatment of lows; for blood glucose levels less than 70 mg/dl, take 15 grams of fast acting carbohydrates (3-4 glucose tabs, 4 ounces of juice, or as discussed). Retest in 15 min. If not above 70 mg/dl, retreat.   Call Ana Luisa Barrientos RD at [phone] (option 3) for problems/concerns.     Ana Luisa Barrientos RD

## 2024-08-12 NOTE — TELEPHONE ENCOUNTER
Pt's daughter called, stated again, they don't want insulin.   Also questioning whether pt is taking all of the oral meds - some friends and family report seeing pills on the floor or on counter.

## 2024-08-20 NOTE — TELEPHONE ENCOUNTER
Called and notified ABDULLAHI Day of Dr Nassar's advice as stated below. Verbalized understanding.

## 2024-08-26 DIAGNOSIS — E11.65 TYPE 2 DIABETES MELLITUS WITH HYPERGLYCEMIA, WITHOUT LONG-TERM CURRENT USE OF INSULIN (HCC): ICD-10-CM

## 2024-08-27 NOTE — TELEPHONE ENCOUNTER
Per TE 7/31/24: take glipizide 10mg BID.    Endocrine Refill protocol for oral and injectable diabetic medications    Protocol Criteria:  FAILED  Reason: Elevated A1C  -Appointment with Endocrinology completed in the last 6 months or scheduled in the next 3 months    -A1c result below 8.5% in the past 6 months      Verify the above has been completed or scheduled in the appropriate timeline. If so can send a 90 day supply with 1 refill.     Last completed office visit: 7/23/2024 Emily Nassar MD   Next scheduled Follow up:   Future Appointments   Date Time Provider Department Center   10/29/2024 11:15 AM Emily Nassar MD [facility]      Last A1c result: Last A1c value was 8.7% done 7/23/2024.

## 2024-08-30 RX ORDER — GLIPIZIDE 10 MG/1
10 TABLET ORAL
Qty: 180 TABLET | Refills: 1 | Status: SHIPPED | OUTPATIENT
Start: 2024-08-30

## 2024-09-24 ENCOUNTER — TELEPHONE (OUTPATIENT)
Dept: ENDOCRINOLOGY CLINIC | Facility: CLINIC | Age: 84
End: 2024-09-24

## 2024-09-24 NOTE — TELEPHONE ENCOUNTER
Per TE dtd 7/31/24: take glipizide 10mg BID     Spoke to pharmacist at [place] [facility] to advise dose of glipizide is 10mg BID - she stated understanding and denied further questions at this time

## 2024-10-03 RX ORDER — GLIPIZIDE 10 MG/1
10 TABLET ORAL
Qty: 90 TABLET | Refills: 0 | Status: SHIPPED | OUTPATIENT
Start: 2024-10-03

## 2024-10-03 NOTE — TELEPHONE ENCOUNTER
Endocrine Refill protocol for oral and injectable diabetic medications    Protocol Criteria:  FAILED  Reason: N/A    If all below requirements are met, send a 90-day supply with 1 refill per provider protocol.    Verify appointment with Endocrinology completed in the last 6 months or scheduled in the next 3 months.  Verify A1C has been completed within the last 6 months and is below 8.5%     Last completed office visit: 7/23/2024 Emily Nassar MD   Next scheduled Follow up:   Future Appointments   Date Time Provider Department Center   10/29/2024 11:15 AM Emily Nassar MD [facility]      Last A1c result: Last A1c value was 8.7% done 7/23/2024.

## 2024-10-28 ENCOUNTER — APPOINTMENT (OUTPATIENT)
Dept: CT IMAGING | Facility: HOSPITAL | Age: 84
End: 2024-10-28
Attending: EMERGENCY MEDICINE
Payer: MEDICARE

## 2024-10-28 ENCOUNTER — HOSPITAL ENCOUNTER (EMERGENCY)
Facility: HOSPITAL | Age: 84
Discharge: HOME | End: 2024-10-28
Attending: EMERGENCY MEDICINE
Payer: MEDICARE

## 2024-10-28 ENCOUNTER — TELEPHONE (OUTPATIENT)
Dept: ENDOCRINOLOGY CLINIC | Facility: CLINIC | Age: 84
End: 2024-10-28

## 2024-10-28 VITALS
OXYGEN SATURATION: 95 % | SYSTOLIC BLOOD PRESSURE: 142 MMHG | BODY MASS INDEX: 30 KG/M2 | TEMPERATURE: 98 F | HEART RATE: 84 BPM | DIASTOLIC BLOOD PRESSURE: 84 MMHG | RESPIRATION RATE: 18 BRPM | WEIGHT: 173.94 LBS

## 2024-10-28 DIAGNOSIS — R10.10 UPPER ABDOMINAL PAIN: Primary | ICD-10-CM

## 2024-10-28 LAB
ALBUMIN SERPL-MCNC: 4.7 G/DL (ref 3.2–4.8)
ALBUMIN/GLOB SERPL: 1.8 {RATIO} (ref 1–2)
ALP LIVER SERPL-CCNC: 86 U/L
ALT SERPL-CCNC: 14 U/L
ANION GAP SERPL CALC-SCNC: 8 MMOL/L (ref 0–18)
AST SERPL-CCNC: 22 U/L (ref ?–34)
BASOPHILS # BLD AUTO: 0.02 X10(3) UL (ref 0–0.2)
BASOPHILS NFR BLD AUTO: 0.2 %
BILIRUB SERPL-MCNC: 0.4 MG/DL (ref 0.2–1.1)
BUN BLD-MCNC: 18 MG/DL (ref 9–23)
BUN/CREAT SERPL: 22 (ref 10–20)
CALCIUM BLD-MCNC: 9.6 MG/DL (ref 8.7–10.4)
CHLORIDE SERPL-SCNC: 104 MMOL/L (ref 98–112)
CO2 SERPL-SCNC: 25 MMOL/L (ref 21–32)
CREAT BLD-MCNC: 0.82 MG/DL
D DIMER PPP FEU-MCNC: 0.98 UG/ML FEU (ref ?–0.84)
DEPRECATED RDW RBC AUTO: 44.1 FL (ref 35.1–46.3)
EGFRCR SERPLBLD CKD-EPI 2021: 70 ML/MIN/1.73M2 (ref 60–?)
EOSINOPHIL # BLD AUTO: 0.02 X10(3) UL (ref 0–0.7)
EOSINOPHIL NFR BLD AUTO: 0.2 %
ERYTHROCYTE [DISTWIDTH] IN BLOOD BY AUTOMATED COUNT: 17 % (ref 11–15)
GLOBULIN PLAS-MCNC: 2.6 G/DL (ref 2–3.5)
GLUCOSE BLD-MCNC: 226 MG/DL (ref 70–99)
HCT VFR BLD AUTO: 33.7 %
HGB BLD-MCNC: 10.9 G/DL
IMM GRANULOCYTES # BLD AUTO: 0.06 X10(3) UL (ref 0–1)
IMM GRANULOCYTES NFR BLD: 0.7 %
LIPASE SERPL-CCNC: 26 U/L (ref 12–53)
LYMPHOCYTES # BLD AUTO: 2.26 X10(3) UL (ref 1–4)
LYMPHOCYTES NFR BLD AUTO: 26.4 %
MCH RBC QN AUTO: 23.6 PG (ref 26–34)
MCHC RBC AUTO-ENTMCNC: 32.3 G/DL (ref 31–37)
MCV RBC AUTO: 72.9 FL
MONOCYTES # BLD AUTO: 0.91 X10(3) UL (ref 0.1–1)
MONOCYTES NFR BLD AUTO: 10.6 %
NEUTROPHILS # BLD AUTO: 5.28 X10 (3) UL (ref 1.5–7.7)
NEUTROPHILS # BLD AUTO: 5.28 X10(3) UL (ref 1.5–7.7)
NEUTROPHILS NFR BLD AUTO: 61.9 %
OSMOLALITY SERPL CALC.SUM OF ELEC: 293 MOSM/KG (ref 275–295)
PLATELET # BLD AUTO: 251 10(3)UL (ref 150–450)
POTASSIUM SERPL-SCNC: 3.8 MMOL/L (ref 3.5–5.1)
PROT SERPL-MCNC: 7.3 G/DL (ref 5.7–8.2)
RBC # BLD AUTO: 4.62 X10(6)UL
SODIUM SERPL-SCNC: 137 MMOL/L (ref 136–145)
WBC # BLD AUTO: 8.6 X10(3) UL (ref 4–11)

## 2024-10-28 PROCEDURE — 74177 CT ABD & PELVIS W/CONTRAST: CPT | Performed by: EMERGENCY MEDICINE

## 2024-10-28 PROCEDURE — 85025 COMPLETE CBC W/AUTO DIFF WBC: CPT | Performed by: EMERGENCY MEDICINE

## 2024-10-28 PROCEDURE — 85379 FIBRIN DEGRADATION QUANT: CPT | Performed by: EMERGENCY MEDICINE

## 2024-10-28 PROCEDURE — 99285 EMERGENCY DEPT VISIT HI MDM: CPT

## 2024-10-28 PROCEDURE — 96374 THER/PROPH/DIAG INJ IV PUSH: CPT

## 2024-10-28 PROCEDURE — 83690 ASSAY OF LIPASE: CPT | Performed by: EMERGENCY MEDICINE

## 2024-10-28 PROCEDURE — 80053 COMPREHEN METABOLIC PANEL: CPT | Performed by: EMERGENCY MEDICINE

## 2024-10-28 PROCEDURE — 99284 EMERGENCY DEPT VISIT MOD MDM: CPT

## 2024-10-28 PROCEDURE — 71260 CT THORAX DX C+: CPT | Performed by: EMERGENCY MEDICINE

## 2024-10-28 RX ORDER — CYANOCOBALAMIN 1000 UG/ML
INJECTION, SOLUTION INTRAMUSCULAR; SUBCUTANEOUS
COMMUNITY
Start: 2023-11-15

## 2024-10-28 RX ORDER — PANCRELIPASE 60000; 12000; 38000 [USP'U]/1; [USP'U]/1; [USP'U]/1
1 CAPSULE, DELAYED RELEASE PELLETS ORAL 3 TIMES DAILY
COMMUNITY

## 2024-10-28 RX ORDER — MORPHINE SULFATE 2 MG/ML
2 INJECTION, SOLUTION INTRAMUSCULAR; INTRAVENOUS ONCE
Status: COMPLETED | OUTPATIENT
Start: 2024-10-28 | End: 2024-10-28

## 2024-10-28 NOTE — ED QUICK NOTES
Family here to take patient home. Pt able to ambulate by self with slow and steady gait. Discharge instructions reviewed with daughter. Daughter verbalized understanding.

## 2024-10-28 NOTE — DISCHARGE INSTRUCTIONS
Please avoid caffeine, acidic foods, spicy foods, and eat regular meals and take your Creon on a regular timely basis.   Please follow up with your neuroendocrine specialist and keep the appointment for the PET scan next month.

## 2024-10-28 NOTE — TELEPHONE ENCOUNTER
Received priority call from Daughter in law. Patient was seen at ER due to elevated BP and cancelled appointment for tomorrow.     Apt scheduled on 10/30 at 315 pm

## 2024-10-28 NOTE — ED INITIAL ASSESSMENT (HPI)
Pt to ED via EMS from home with c/o generalized abdominal pain since Saturday. +loose stools since Saturday. Pt denies vomiting or nausea. Pt denies fall or trauma. Pt annette fever. Pt states negative COVID testing at home yesterday. Pt annette black or bloody stools. Pt with hx of pancreatic cancer on Chemo.

## 2024-10-28 NOTE — ED QUICK NOTES
Awaiting daughter to  patient. Comfort measures provided. Importance of MD follow up reviewed. Pt verbalized understanding. Pt tolerating crackers and water. MD aware.

## 2024-10-28 NOTE — TELEPHONE ENCOUNTER
Patient's relative calling to cancel appointment for tomorrow. States her high blood pressure and sugars and she does not need insulin. Patient is currently at the hospital. Please call.

## 2024-10-28 NOTE — ED PROVIDER NOTES
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  She has pain in the upper abdomen.  There is no nausea or vomiting.  No diarrhea or constipation but she reports that her stools are softer.  No blood in the stools.  No dysuria or hematuria.  She reports mild shortness of breath.  No fever or chills.  No swelling in the legs.  No chest pain.      Objective:     Past Medical History:    Anxiety state    Asthma (HCC)    Depression    Diabetes (HCC)    Essential hypertension    High blood pressure    High cholesterol    Hyperlipidemia    Sleep apnea              Past Surgical History:   Procedure Laterality Date    Knee replacement surgery      Removal gallbladder      Repair ing hernia,5+y/o,reducibl      Splenectomy      Total abdom hysterectomy                  Social History     Socioeconomic History    Marital status:    Tobacco Use    Smoking status: Never    Smokeless tobacco: Never   Vaping Use    Vaping status: Never Used   Substance and Sexual Activity    Alcohol use: Never    Drug use: Never     Social Drivers of Health     Food Insecurity: No Food Insecurity (3/30/2024)    Food Insecurity     Food Insecurity: Never true   Transportation Needs: No Transportation Needs (3/30/2024)    Transportation Needs     Lack of Transportation: No   Housing Stability: Low Risk  (3/30/2024)    Housing Stability     Housing Instability: No                  Physical Exam     ED Triage Vitals [10/28/24 1220]   BP (!) 163/89   Pulse 90   Resp 19   Temp 97.9 °F (36.6 °C)   Temp src Temporal   SpO2 96 %   O2 Device None (Room air)       Current  Vitals:   Vital Signs  BP: 142/84  Pulse: 84  Resp: 18  Temp: 97.9 °F (36.6 °C)  Temp src: Temporal    Oxygen Therapy  SpO2: 95 %  O2 Device: None (Room air)        Physical Exam  Vitals and nursing note reviewed.   Constitutional:       Appearance: She is well-developed.   HENT:      Head: Normocephalic and atraumatic.   Eyes:      Extraocular Movements: Extraocular movements intact.   Cardiovascular:      Rate and Rhythm: Normal rate and regular rhythm.      Heart sounds: Normal heart sounds.   Pulmonary:      Effort: Pulmonary effort is normal.      Breath sounds: Normal breath sounds.   Abdominal:      General: There is no distension.      Palpations: Abdomen is soft.      Tenderness: There is abdominal tenderness in the epigastric area. There is no right CVA tenderness or left CVA tenderness.   Musculoskeletal:         General: Normal range of motion.      Cervical back: Normal range of motion.   Skin:     General: Skin is warm.   Neurological:      Mental Status: She is alert.      Comments: No focal deficits       Differential diagnosis includes but is not limited to gastritis, GERD, increased metastatic burden    ED Course     Labs Reviewed   CBC WITH DIFFERENTIAL WITH PLATELET - Abnormal; Notable for the following components:       Result Value    HGB 10.9 (*)     HCT 33.7 (*)     MCV 72.9 (*)     MCH 23.6 (*)     RDW 17.0 (*)     All other components within normal limits   COMP METABOLIC PANEL (14) - Abnormal; Notable for the following components:    Glucose 226 (*)     BUN/CREA Ratio 22.0 (*)     All other components within normal limits   D-DIMER - Abnormal; Notable for the following components:    D-Dimer 0.98 (*)     All other components within normal limits   LIPASE - Normal   RAINBOW DRAW BLUE          CT CHEST PE AORTA (IV ONLY) (CPT=71260)    Addendum Date: 10/28/2024    ADDENDUM:  Relatively dense 16 x 17 mm pretracheal lymphadenopathy is suspicious for metastatic neuroendocrine tumor.   Dictated  by (CST): Shubham Saenz MD on 10/28/2024 at 2:07 PM     Finalized by (CST): Shubham Saenz MD on 10/28/2024 at 2:07 PM             Result Date: 10/28/2024  CONCLUSION:  1. No pulmonary embolus or aortic dissection.  No acute finding. 2. Mild enlargement of main pulmonary artery suggests pulmonary hypertension. 3. Nonspecific mediastinal lymphadenopathy.  Consider short interval follow-up CT in 6 months. 4. A 1.9 cm hypoattenuating right thyroid nodule.  Based on ACR guidelines for incidental thyroid nodules measuring greater than 1.5 cm, a baseline thyroid ultrasound is recommended. 5. Please see separate abdominal pelvic CT report.     Dictated by (CST): Shubham Saenz MD on 10/28/2024 at 1:38 PM     Finalized by (CST): Shubham Saenz MD on 10/28/2024 at 1:49 PM          CT ABDOMEN+PELVIS(CONTRAST ONLY)(CPT=74177)    Result Date: 10/28/2024  CONCLUSION:  1. Mild abnormal mural thickening involving the stomach or less likely pseudo thickening from luminal underdistention.  Findings suggest gastritis. 2. Nonspecific mural thickening involving long segment of ileum and ascending colon compatible with nonspecific ileocolitis. 3. Thickening of greater omentum in the anterior pelvis is new relative to previous, and could be inflammatory disease or metastatic disease. 4. A 3.5 cm exophytic left upper pole renal mass compatible with known neuroendocrine metastasis. 5. Post distal pancreatectomy and splenectomy.      Dictated by (CST): Shubham Saenz MD on 10/28/2024 at 1:50 PM     Finalized by (CST): Shubham Saenz MD on 10/28/2024 at 2:06 PM              MDM            Medical Decision Making  Vitals are stable in the ED.  Patient is comfortable and ambulatory.  CBC negative for leukocytosis.  CMP with hyperglycemia without gap or acidosis.  Lipase normal.  Per my independent interpretation of CT chest, no clear PE, other radiology findings noted as above.  Did advise supportive care, good hydration and close follow-up  in the outpatient setting and she and her family members agreeable with the plan.    Problems Addressed:  Upper abdominal pain: complicated acute illness or injury with systemic symptoms    Amount and/or Complexity of Data Reviewed  Independent Historian: caregiver     Details: I spoke with patient's son on the phone who feels that symptoms may be related to noncompliance with her Creon.  He does request that she not be started on any new medications and as such, we discussed dietary modifications for possible gastritis.  External Data Reviewed: radiology.     Details:   I reviewed PET staging study report from 4/29/24 which documents upper tracheal lymphadenopathy, and LUQ metastatic disease.  Labs: ordered. Decision-making details documented in ED Course.  Radiology: ordered and independent interpretation performed. Decision-making details documented in ED Course.    Risk  OTC drugs.  Decision regarding hospitalization.        Disposition and Plan     Clinical Impression:  1. Upper abdominal pain         Disposition:  Discharge  10/28/2024  2:42 pm    Follow-up:  Dain Carias MD  [address] [facility]   [address] [place], [place] [address] [phone]          We recommend that you schedule follow up care with a primary care provider within the next three months to obtain basic health screening including reassessment of your blood pressure.      Medications Prescribed:  Discharge Medication List as of 10/28/2024  2:45 PM              Supplementary Documentation:

## 2024-10-28 NOTE — TELEPHONE ENCOUNTER
Jim states patient was seen in EMERGENCY ROOM and is being told that patient should be seen by Endocrinologist tomorrow.  Please call.  Thank you.

## 2024-10-30 ENCOUNTER — OFFICE VISIT (OUTPATIENT)
Dept: ENDOCRINOLOGY CLINIC | Facility: CLINIC | Age: 84
End: 2024-10-30

## 2024-10-30 VITALS
SYSTOLIC BLOOD PRESSURE: 90 MMHG | BODY MASS INDEX: 28.24 KG/M2 | WEIGHT: 165.38 LBS | HEIGHT: 64 IN | DIASTOLIC BLOOD PRESSURE: 51 MMHG | HEART RATE: 70 BPM

## 2024-10-30 DIAGNOSIS — E11.65 TYPE 2 DIABETES MELLITUS WITH HYPERGLYCEMIA, WITHOUT LONG-TERM CURRENT USE OF INSULIN (HCC): Primary | ICD-10-CM

## 2024-10-30 DIAGNOSIS — E55.9 VITAMIN D DEFICIENCY: ICD-10-CM

## 2024-10-30 LAB
AMB EXT GMI: 8.8 %
CARTRIDGE EXPIRATION DATE: ABNORMAL DATE
GLUCOSE BLOOD: 181
HEMOGLOBIN A1C: 8.2 % (ref 4.3–5.6)
TEST STRIP EXPIRATION DATE: NORMAL DATE
TEST STRIP LOT #: NORMAL NUMERIC

## 2024-10-30 PROCEDURE — 82947 ASSAY GLUCOSE BLOOD QUANT: CPT | Performed by: INTERNAL MEDICINE

## 2024-10-30 PROCEDURE — 99214 OFFICE O/P EST MOD 30 MIN: CPT | Performed by: INTERNAL MEDICINE

## 2024-10-30 PROCEDURE — 83036 HEMOGLOBIN GLYCOSYLATED A1C: CPT | Performed by: INTERNAL MEDICINE

## 2024-10-30 NOTE — PROGRESS NOTES
-----------------------------  Dexcom Clarity  -----------------------------  Yosijose daniellizett Pal  YOB: 1940  Generated at: Wed, Oct 30, 2024 3:26 PM CDT  Reporting period: Tue Oct 1, 2024 - Wed Oct 30, 2024  -----------------------------  Glucose Details  Average glucose: 228 mg/dL  Standard deviation: 38 mg/dL  -----------------------------  Time in Range  Very High: 24%  High: 68%  In Range: 8%  Low: 0%  Very Low: 0%  Target Range   mg/dL    -----------------------------  CGM Details  Sensor usage: 97%  Days with CGM data: 29/30

## 2024-10-30 NOTE — PROGRESS NOTES
Subjective:   Stacy Pal is a 84 year old female who presents for Diabetes (Follow up and A1c check ) and ER F/U A few visits ago I increased her Janumet 50/1000 to twice daily. After this, she had started to have elevated blood sugars and so we started her on Glipizide 10mg PO qAM and then increased to twice daily. Since that visit, she had been having low blood sugars and so we decreased the dose to 5mg PO bid. We then changed it to 10mg PO qAM. She comes in follow-up.     She has come today for follow-up of type 2 diabetes.   She currently takes:  Janumet  PO bid  Farxiga 10mg PO qAM  Pioglitazone 30mg PO qAM  Glipizide 10mg PO qAM    Checking blood sugars 4-5 times a day with the Dexcom:    -----------------------------  Dexcom Clarity  -----------------------------  Darrell Pal  YOB: 1940  Generated at: Wed, Oct 30, 2024 3:26 PM CDT  Reporting period: Tue Oct 1, 2024 - Wed Oct 30, 2024  -----------------------------  Glucose Details  Average glucose: 228 mg/dL  Standard deviation: 38 mg/dL  -----------------------------  Time in Range  Very High: 24%  High: 68%  In Range: 8%  Low: 0%  Very Low: 0%  Target Range   mg/dL     -----------------------------  CGM Details  Sensor usage: 97%  Days with CGM data: 29/30    History/Other:    Chief Complaint Reviewed and Verified  Nursing Notes Reviewed and   Verified         Tobacco:  She has never smoked tobacco.    Current Outpatient Medications   Medication Sig Dispense Refill    GLIPIZIDE 10 MG Oral Tab TAKE 1 TABLET(10 MG) BY MOUTH DAILY WITH BREAKFAST 90 tablet 0    dapagliflozin (FARXIGA) 10 MG Oral Tab Take 1 tablet (10 mg total) by mouth daily. 90 tablet 1    SITagliptin-metFORMIN HCl ER (JANUMET XR)  MG Oral Tablet 24 Hr Take 50-1,000 mg by mouth in the morning and 50-1,000 mg before bedtime. I tablet in the morning 1 tablet at night. 180 tablet 1    pioglitazone 30 MG Oral Tab Take 1 tablet (30 mg total) by  mouth daily. 90 tablet 1    Pancrelipase, Lip-Prot-Amyl, (CREON) 78478-03108 units Oral Cap DR Particles Take 1 capsule by mouth 3 (three) times daily.      cyanocobalamin 1000 MCG/ML Injection Solution Inject 1 mL every month by subcutaneous route.      Continuous Glucose Sensor (DEXCOM G7 SENSOR) Does not apply Misc 1 each Every 10 days. Use as directed every 10 days 3 each 11    Continuous Glucose  (DEXCOM G7 ) Does not apply Device 1 each daily. 1 each 0    fluticasone propionate 50 MCG/ACT Nasal Suspension 1 spray by Each Nare route daily. 16 mL 0    pregabalin 100 MG Oral Cap Take 1 capsule (100 mg total) by mouth 2 (two) times daily.      everolimus 5 MG Oral Tab Take 5 mg by mouth See Admin Instructions. **Patient must take medication at 1500 with food**      Pantoprazole Sodium 40 MG Oral Tab EC Take 1 tablet (40 mg total) by mouth every morning before breakfast.      losartan 100 MG Oral Tab Take by mouth daily.      simvastatin 40 MG Oral Tab Take 1 tablet (40 mg total) by mouth nightly.      Cholecalciferol 125 MCG (5000 UT) Oral Tab Take 1 tablet (5,000 Units total) by mouth daily.           Review of Systems:  Pertinent items are noted in HPI.    Objective:   BP 90/51   Pulse 70   Ht 5' 4\" (1.626 m)   Wt 165 lb 6.4 oz (75 kg)   BMI 28.39 kg/m²  Estimated body mass index is 28.39 kg/m² as calculated from the following:    Height as of this encounter: 5' 4\" (1.626 m).    Weight as of this encounter: 165 lb 6.4 oz (75 kg).  BP 90/51   Pulse 70   Ht 5' 4\" (1.626 m)   Wt 165 lb 6.4 oz (75 kg)   BMI 28.39 kg/m²     General Appearance:  Alert, cooperative, no distress, appears stated age   Head:  Normocephalic, without obvious abnormality, atraumatic   Eyes:  PERRL, conjunctiva/corneas clear, EOM's intact, fundi benign, both eyes   Ears:  Normal TM's and external ear canals, both ears   Nose: Nares normal, septum midline,mucosa normal, no drainage or sinus tenderness   Throat: Lips,  mucosa, and tongue normal; teeth and gums normal                               Extremities: Extremities normal, atraumatic, no cyanosis or edema   Pulses: 2+ and symmetric   Skin: Skin color, texture, turgor normal, no rashes or lesions   Lymph nodes: Cervical, supraclavicular, and axillary nodes normal   Neurologic: Normal       Assessment & Plan:   This is a 84 year-old woman with type 2 diabetes, here for follow up of management of type 2 diabetes.     I would like to continue the Janumet 50/1000 PO bid; Farxiga 10mg PO qday; Pioglitazone 30mg PO qday  - I would like to continue the glipizide 10mg PO qAM  - I have given her a handout so that she now knows what to do when blood sugars go low.     I would like to see her back in 3 months; Labs are within normal limits    Prior to this encounter, I spent over 15 minutes with preparing for the visit, including reviewing documents from other specialties as well as from PCP and going over test results and imaging studies. During the face to face encounter, I spent an additional 15 minutes which were determined for follow-up. Greater than 50% of the time was spent in counseling, anticipatory guidance, and coordination of care. Patient concerns were answered to the best of my knowledge.     10/30/24  Emily Nassar MD

## 2024-10-31 RX ORDER — GLIPIZIDE 10 MG/1
10 TABLET ORAL
Qty: 180 TABLET | Refills: 2 | Status: SHIPPED | OUTPATIENT
Start: 2024-10-31

## 2024-10-31 RX ORDER — SITAGLIPTIN AND METFORMIN HYDROCHLORIDE 1000; 50 MG/1; MG/1
TABLET, FILM COATED, EXTENDED RELEASE ORAL 2 TIMES DAILY
Qty: 180 TABLET | Refills: 1 | Status: SHIPPED | OUTPATIENT
Start: 2024-10-31

## 2024-10-31 RX ORDER — PIOGLITAZONE 30 MG/1
30 TABLET ORAL DAILY
Qty: 90 TABLET | Refills: 1 | Status: SHIPPED | OUTPATIENT
Start: 2024-10-31

## 2024-10-31 RX ORDER — DAPAGLIFLOZIN 10 MG/1
10 TABLET, FILM COATED ORAL DAILY
Qty: 90 TABLET | Refills: 1 | Status: SHIPPED | OUTPATIENT
Start: 2024-10-31

## 2024-11-25 ENCOUNTER — APPOINTMENT (OUTPATIENT)
Dept: ENDOCRINOLOGY | Age: 84
End: 2024-11-25

## 2024-11-25 VITALS
HEART RATE: 64 BPM | SYSTOLIC BLOOD PRESSURE: 105 MMHG | WEIGHT: 167.22 LBS | HEIGHT: 64 IN | DIASTOLIC BLOOD PRESSURE: 71 MMHG | OXYGEN SATURATION: 97 % | BODY MASS INDEX: 28.55 KG/M2

## 2024-11-25 DIAGNOSIS — K86.9 DIABETES MELLITUS ASSOCIATED WITH PANCREATIC DISEASE  (CMD): Primary | ICD-10-CM

## 2024-11-25 DIAGNOSIS — E11.69 DIABETES MELLITUS ASSOCIATED WITH PANCREATIC DISEASE  (CMD): Primary | ICD-10-CM

## 2024-11-25 DIAGNOSIS — D3A.8 NEUROENDOCRINE TUMOR OF PANCREAS (CMD): ICD-10-CM

## 2024-11-25 DIAGNOSIS — E11.649 HYPOGLYCEMIA ASSOCIATED WITH DIABETES  (CMD): ICD-10-CM

## 2024-11-25 DIAGNOSIS — Z90.410 HISTORY OF PANCREATECTOMY: ICD-10-CM

## 2024-11-25 RX ORDER — DAPAGLIFLOZIN 10 MG/1
10 TABLET, FILM COATED ORAL DAILY
Qty: 90 TABLET | Refills: 1 | Status: SHIPPED | OUTPATIENT
Start: 2024-11-25

## 2024-11-25 RX ORDER — FLUTICASONE PROPIONATE 50 MCG
2 SPRAY, SUSPENSION (ML) NASAL DAILY
COMMUNITY

## 2024-11-25 RX ORDER — EVEROLIMUS 5 MG/1
1 TABLET ORAL DAILY
COMMUNITY

## 2024-11-25 RX ORDER — ACYCLOVIR 400 MG/1
TABLET ORAL
COMMUNITY
Start: 2024-07-08

## 2024-11-25 RX ORDER — PANCRELIPASE 60000; 12000; 38000 [USP'U]/1; [USP'U]/1; [USP'U]/1
CAPSULE, DELAYED RELEASE PELLETS ORAL
COMMUNITY

## 2024-11-25 RX ORDER — PIOGLITAZONE 30 MG/1
30 TABLET ORAL
Qty: 90 TABLET | Refills: 1 | Status: SHIPPED | OUTPATIENT
Start: 2024-11-25

## 2024-11-25 RX ORDER — GLIPIZIDE 10 MG/1
10 TABLET ORAL
Qty: 180 TABLET | Refills: 1 | Status: SHIPPED | OUTPATIENT
Start: 2024-11-25

## 2024-11-25 RX ORDER — LANOLIN ALCOHOL/MO/W.PET/CERES
1 CREAM (GRAM) TOPICAL
COMMUNITY

## 2024-11-25 RX ORDER — SITAGLIPTIN AND METFORMIN HYDROCHLORIDE 1000; 50 MG/1; MG/1
1 TABLET, FILM COATED ORAL 2 TIMES DAILY WITH MEALS
Qty: 180 TABLET | Refills: 1 | Status: SHIPPED | OUTPATIENT
Start: 2024-11-25

## 2025-03-04 ENCOUNTER — APPOINTMENT (OUTPATIENT)
Dept: ENDOCRINOLOGY | Age: 85
End: 2025-03-04

## 2025-03-04 VITALS
WEIGHT: 162.7 LBS | HEART RATE: 74 BPM | TEMPERATURE: 96.4 F | BODY MASS INDEX: 27.93 KG/M2 | SYSTOLIC BLOOD PRESSURE: 142 MMHG | DIASTOLIC BLOOD PRESSURE: 87 MMHG | OXYGEN SATURATION: 97 %

## 2025-03-04 DIAGNOSIS — D3A.8 NEUROENDOCRINE TUMOR OF PANCREAS (CMD): ICD-10-CM

## 2025-03-04 DIAGNOSIS — Z90.410 HISTORY OF PANCREATECTOMY: ICD-10-CM

## 2025-03-04 DIAGNOSIS — K86.9 DIABETES MELLITUS ASSOCIATED WITH PANCREATIC DISEASE  (CMD): Primary | ICD-10-CM

## 2025-03-04 DIAGNOSIS — E11.69 DIABETES MELLITUS ASSOCIATED WITH PANCREATIC DISEASE  (CMD): Primary | ICD-10-CM

## 2025-03-04 DIAGNOSIS — E11.649 HYPOGLYCEMIA ASSOCIATED WITH DIABETES  (CMD): ICD-10-CM

## 2025-03-04 LAB — HBA1C MFR BLD: 7.4 % (ref 4.5–5.6)

## 2025-04-04 ENCOUNTER — TELEPHONE (OUTPATIENT)
Dept: ENDOCRINOLOGY CLINIC | Facility: CLINIC | Age: 85
End: 2025-04-04

## 2025-04-04 NOTE — TELEPHONE ENCOUNTER
Medication Quantity Refills Start End   dapagliflozin (FARXIGA) 10 MG Oral Tab 90 tablet 1 10/31/2024 --   Sig:   Take 1 tablet (10 mg total) by mouth daily.     Route:   Oral     Order #:   [id number]         Prior Authorization Needed    KEY: [id number]

## 2025-04-08 ENCOUNTER — TELEPHONE (OUTPATIENT)
Dept: ENDOCRINOLOGY CLINIC | Facility: CLINIC | Age: 85
End: 2025-04-08

## 2025-04-09 NOTE — TELEPHONE ENCOUNTER
PA approved: Approved on April 7 by [employer] 2017  Approved. The requested medication is covered on the formulary with a Quantity Limit (QL) of 30 per 30 days. Since this request is within the QL, it does not require a Coverage Determination Request. Please call the pharmacy to process the prescription claim.  Effective Date: 4/7/2025  Authorization Expiration Date: 12/31/2025    Patient notified.

## 2025-04-09 NOTE — TELEPHONE ENCOUNTER
Form has been reviewed, completed and signed by covering provider Dr. Obrien.       Forms faxed awaiting, fax confirmation.

## 2025-04-09 NOTE — TELEPHONE ENCOUNTER
Received a fax form from [facility] [employer] requesting chart notes for a request of cgm supplies, will place in the covering providers folder for review, due to Dr. Nassar being on [facility]AY[facility].

## 2025-05-22 DIAGNOSIS — E11.69 DIABETES MELLITUS ASSOCIATED WITH PANCREATIC DISEASE  (CMD): ICD-10-CM

## 2025-05-22 DIAGNOSIS — K86.9 DIABETES MELLITUS ASSOCIATED WITH PANCREATIC DISEASE  (CMD): ICD-10-CM

## 2025-05-22 RX ORDER — SITAGLIPTIN AND METFORMIN HYDROCHLORIDE 1000; 50 MG/1; MG/1
1 TABLET, FILM COATED ORAL 2 TIMES DAILY WITH MEALS
Qty: 180 TABLET | Refills: 1 | Status: SHIPPED | OUTPATIENT
Start: 2025-05-22

## 2025-06-04 ENCOUNTER — TELEPHONE (OUTPATIENT)
Dept: ENDOCRINOLOGY CLINIC | Facility: CLINIC | Age: 85
End: 2025-06-04

## 2025-06-04 DIAGNOSIS — E11.65 TYPE 2 DIABETES MELLITUS WITH HYPERGLYCEMIA, WITHOUT LONG-TERM CURRENT USE OF INSULIN (HCC): ICD-10-CM

## 2025-06-04 RX ORDER — SITAGLIPTIN AND METFORMIN HYDROCHLORIDE 1000; 50 MG/1; MG/1
1 TABLET, FILM COATED, EXTENDED RELEASE ORAL 2 TIMES DAILY
Qty: 180 TABLET | Refills: 0 | Status: SHIPPED | OUTPATIENT
Start: 2025-06-04

## 2025-06-04 NOTE — TELEPHONE ENCOUNTER
Pharmacy is requesting confirmation that the Janumet should be XR.  Patient has been getting plain Janumet from a Dr Izaguirre.  Please call

## 2025-06-04 NOTE — TELEPHONE ENCOUNTER
Endocrine Refill protocol for oral and injectable diabetic medications    Protocol Criteria:  FAILED  Reason: No Visit in required time frame lmtcb    If all below requirements are met, send a 90-day supply with 1 refill per provider protocol.    Verify appointment with Endocrinology completed in the last 6 months or scheduled in the next 3 months.  Verify A1C has been completed within the last 6 months and is below 8.5%     Last completed office visit: 10/30/2024 Emily Nassar MD   Next scheduled Follow up: No future appointments. Lmtcb   Last A1c result: Last A1c value was 8.2% done 10/30/2024.

## 2025-06-05 NOTE — TELEPHONE ENCOUNTER
please advise MA called pharmacy [facility] spoke with marvel who stated the pt picked up regular janument  on 5/22 prescribe by Dr.sadaa meyer. Pharmacy is calling to confirm is pt suppose to take janumet xr that you sent or regular janumet that pt just picked up.

## 2025-06-06 NOTE — TELEPHONE ENCOUNTER
Hi!    Please find out when patient last saw Dr. Izaguirre. This doctor is also an endocrinologist and patient may be following with her now.     Please clarify. If she is not and she is following with me, then patient should Janumet XR. Thank you!

## 2025-06-10 ENCOUNTER — HOSPITAL ENCOUNTER (EMERGENCY)
Facility: HOSPITAL | Age: 85
Discharge: HOME | End: 2025-06-10
Attending: EMERGENCY MEDICINE
Payer: MEDICARE

## 2025-06-10 ENCOUNTER — APPOINTMENT (OUTPATIENT)
Dept: CT IMAGING | Facility: HOSPITAL | Age: 85
End: 2025-06-10
Attending: EMERGENCY MEDICINE
Payer: MEDICARE

## 2025-06-10 VITALS
TEMPERATURE: 98 F | DIASTOLIC BLOOD PRESSURE: 79 MMHG | SYSTOLIC BLOOD PRESSURE: 131 MMHG | RESPIRATION RATE: 15 BRPM | OXYGEN SATURATION: 97 % | HEART RATE: 71 BPM

## 2025-06-10 DIAGNOSIS — C7A.8 NEUROENDOCRINE CANCER (HCC): ICD-10-CM

## 2025-06-10 DIAGNOSIS — R10.10 UPPER ABDOMINAL PAIN: Primary | ICD-10-CM

## 2025-06-10 LAB
ALBUMIN SERPL-MCNC: 4.5 G/DL (ref 3.2–4.8)
ALBUMIN/GLOB SERPL: 1.7 {RATIO} (ref 1–2)
ALP LIVER SERPL-CCNC: 69 U/L (ref 55–142)
ALT SERPL-CCNC: 12 U/L (ref 10–49)
ANION GAP SERPL CALC-SCNC: 11 MMOL/L (ref 0–18)
AST SERPL-CCNC: 25 U/L (ref ?–34)
BASOPHILS # BLD AUTO: 0.03 X10(3) UL (ref 0–0.2)
BASOPHILS NFR BLD AUTO: 0.4 %
BILIRUB SERPL-MCNC: 0.4 MG/DL (ref 0.2–1.1)
BUN BLD-MCNC: 15 MG/DL (ref 9–23)
BUN/CREAT SERPL: 20.5 (ref 10–20)
CALCIUM BLD-MCNC: 9.3 MG/DL (ref 8.7–10.4)
CHLORIDE SERPL-SCNC: 91 MMOL/L (ref 98–112)
CO2 SERPL-SCNC: 24 MMOL/L (ref 21–32)
CREAT BLD-MCNC: 0.73 MG/DL (ref 0.55–1.02)
DEPRECATED RDW RBC AUTO: 37.8 FL (ref 35.1–46.3)
EGFRCR SERPLBLD CKD-EPI 2021: 81 ML/MIN/1.73M2 (ref 60–?)
EOSINOPHIL # BLD AUTO: 0.04 X10(3) UL (ref 0–0.7)
EOSINOPHIL NFR BLD AUTO: 0.5 %
ERYTHROCYTE [DISTWIDTH] IN BLOOD BY AUTOMATED COUNT: 14.1 % (ref 11–15)
GLOBULIN PLAS-MCNC: 2.6 G/DL (ref 2–3.5)
GLUCOSE BLD-MCNC: 153 MG/DL (ref 70–99)
GLUCOSE BLDC GLUCOMTR-MCNC: 153 MG/DL (ref 70–99)
HCT VFR BLD AUTO: 33 % (ref 35–48)
HGB BLD-MCNC: 11 G/DL (ref 12–16)
IMM GRANULOCYTES # BLD AUTO: 0.03 X10(3) UL (ref 0–1)
IMM GRANULOCYTES NFR BLD: 0.4 %
LIPASE SERPL-CCNC: 18 U/L (ref 12–53)
LYMPHOCYTES # BLD AUTO: 1.99 X10(3) UL (ref 1–4)
LYMPHOCYTES NFR BLD AUTO: 26.7 %
MCH RBC QN AUTO: 24.7 PG (ref 26–34)
MCHC RBC AUTO-ENTMCNC: 33.3 G/DL (ref 31–37)
MCV RBC AUTO: 74 FL (ref 80–100)
MONOCYTES # BLD AUTO: 0.98 X10(3) UL (ref 0.1–1)
MONOCYTES NFR BLD AUTO: 13.2 %
NEUTROPHILS # BLD AUTO: 4.37 X10 (3) UL (ref 1.5–7.7)
NEUTROPHILS # BLD AUTO: 4.37 X10(3) UL (ref 1.5–7.7)
NEUTROPHILS NFR BLD AUTO: 58.8 %
OSMOLALITY SERPL CALC.SUM OF ELEC: 266 MOSM/KG (ref 275–295)
PLATELET # BLD AUTO: 262 10(3)UL (ref 150–450)
POTASSIUM SERPL-SCNC: 3.9 MMOL/L (ref 3.5–5.1)
PROT SERPL-MCNC: 7.1 G/DL (ref 5.7–8.2)
RBC # BLD AUTO: 4.46 X10(6)UL (ref 3.8–5.3)
SODIUM SERPL-SCNC: 126 MMOL/L (ref 136–145)
TROPONIN I SERPL HS-MCNC: 8 NG/L (ref ?–34)
WBC # BLD AUTO: 7.4 X10(3) UL (ref 4–11)

## 2025-06-10 PROCEDURE — 93005 ELECTROCARDIOGRAM TRACING: CPT

## 2025-06-10 PROCEDURE — 99284 EMERGENCY DEPT VISIT MOD MDM: CPT

## 2025-06-10 PROCEDURE — 82962 GLUCOSE BLOOD TEST: CPT

## 2025-06-10 PROCEDURE — 84484 ASSAY OF TROPONIN QUANT: CPT | Performed by: EMERGENCY MEDICINE

## 2025-06-10 PROCEDURE — 80053 COMPREHEN METABOLIC PANEL: CPT | Performed by: EMERGENCY MEDICINE

## 2025-06-10 PROCEDURE — 99285 EMERGENCY DEPT VISIT HI MDM: CPT

## 2025-06-10 PROCEDURE — 83690 ASSAY OF LIPASE: CPT | Performed by: EMERGENCY MEDICINE

## 2025-06-10 PROCEDURE — 93010 ELECTROCARDIOGRAM REPORT: CPT

## 2025-06-10 PROCEDURE — 71260 CT THORAX DX C+: CPT | Performed by: EMERGENCY MEDICINE

## 2025-06-10 PROCEDURE — 36415 COLL VENOUS BLD VENIPUNCTURE: CPT

## 2025-06-10 PROCEDURE — 85025 COMPLETE CBC W/AUTO DIFF WBC: CPT | Performed by: EMERGENCY MEDICINE

## 2025-06-10 PROCEDURE — 74177 CT ABD & PELVIS W/CONTRAST: CPT | Performed by: EMERGENCY MEDICINE

## 2025-06-10 RX ORDER — HYDROCODONE BITARTRATE AND ACETAMINOPHEN 5; 325 MG/1; MG/1
1 TABLET ORAL EVERY 6 HOURS PRN
Qty: 10 TABLET | Refills: 0 | Status: SHIPPED | OUTPATIENT
Start: 2025-06-10 | End: 2025-06-17

## 2025-06-10 RX ORDER — ONDANSETRON 4 MG/1
4 TABLET, ORALLY DISINTEGRATING ORAL EVERY 4 HOURS PRN
Qty: 10 TABLET | Refills: 0 | Status: SHIPPED | OUTPATIENT
Start: 2025-06-10 | End: 2025-06-17

## 2025-06-10 RX ORDER — HYDROCODONE BITARTRATE AND ACETAMINOPHEN 5; 325 MG/1; MG/1
1 TABLET ORAL ONCE
Refills: 0 | Status: COMPLETED | OUTPATIENT
Start: 2025-06-10 | End: 2025-06-10

## 2025-06-10 NOTE — DISCHARGE INSTRUCTIONS
Your pain is felt to be secondary to your tumor.  Please continue taking your home medications including Creon and Coleman as needed.  You may take Zofran as needed for vomiting.  Please follow-up with your cancer specialist.

## 2025-06-10 NOTE — ED INITIAL ASSESSMENT (HPI)
Arrives via EMS for abd pain and diarrhea for a few days. C/o headache, dizziness and elevated BG.

## 2025-06-10 NOTE — ED PROVIDER NOTES
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   No fevers.  No cough or congestion.  No shortness of breath.  No dysuria or hematuria.  No leg swelling.  She did not take her Norco - states she only took it around the time of her surgery.      Objective:     Past Medical History:    Anxiety state    Asthma (HCC)    Depression    Diabetes (HCC)    Essential hypertension    High blood pressure    High cholesterol    Hyperlipidemia    Sleep apnea              Past Surgical History:   Procedure Laterality Date    Knee replacement surgery      Removal gallbladder      Repair ing hernia,5+y/o,reducibl      Splenectomy      Total abdom hysterectomy                  Social History     Socioeconomic History    Marital status:    Tobacco Use    Smoking status: Never    Smokeless tobacco: Never   Vaping Use    Vaping status: Never Used   Substance and Sexual Activity    Alcohol use: Never    Drug use: Never     Social Drivers of Health     Food Insecurity: No Food Insecurity (6/4/2025)    Received from [facility]    Hunger Vital Sign     Worried About Running Out of Food in the Last Year: Never true     Ran Out of Food in the Last Year: Never true   Transportation Needs: No Transportation Needs (6/4/2025)    Received from [facility]    PRAPARE - Transportation     Lack of Transportation (Medical): No     Lack of Transportation (Non-Medical): No   Housing Stability: Unknown (6/4/2025)    Received from [facility]     Housing Stability Vital Sign     Unable to Pay for Housing in the Last Year: No     Homeless in the Last Year: No                                Physical Exam    ED Triage Vitals [06/10/25 1113]   BP (!) 163/78   Pulse 81   Resp 20   Temp 98.4 °F (36.9 °C)   Temp src Temporal   SpO2 96 %   O2 Device None (Room air)       Current Vitals:   Vital Signs  BP: 131/79  Pulse: 71  Resp: 15  Temp: 98.4 °F (36.9 °C)  Temp src: Temporal  MAP (mmHg): 96    Oxygen Therapy  SpO2: 97 %  O2 Device: None (Room air)            Physical Exam  Vitals and nursing note reviewed.   Constitutional:       Appearance: She is well-developed.   HENT:      Head: Normocephalic and atraumatic.   Eyes:      Extraocular Movements: Extraocular movements intact.   Cardiovascular:      Rate and Rhythm: Normal rate and regular rhythm.      Heart sounds: Normal heart sounds.   Pulmonary:      Effort: Pulmonary effort is normal.      Breath sounds: Normal breath sounds.   Abdominal:      General: There is no distension.      Palpations: Abdomen is soft.      Tenderness: There is abdominal tenderness in the epigastric area. There is no right CVA tenderness or left CVA tenderness.   Musculoskeletal:         General: Normal range of motion.      Cervical back: Normal range of motion.   Skin:     General: Skin is warm.      Capillary Refill: Capillary refill takes less than 2 seconds.   Neurological:      General: No focal deficit present.      Mental Status: She is alert.      Cranial Nerves: No cranial nerve deficit.      Motor: No weakness.      Comments: No focal deficits       Differential diagnosis includes but not limited to increasing metastatic burden, PE, pancreatitis, SBO, ACS/MI        ED Course  Labs Reviewed   CBC WITH DIFFERENTIAL WITH PLATELET - Abnormal; Notable for the following components:       Result Value    HGB 11.0 (*)     HCT 33.0 (*)     MCV 74.0 (*)     MCH 24.7 (*)     All other components within normal limits   COMP METABOLIC  PANEL (14) - Abnormal; Notable for the following components:    Glucose 153 (*)     Sodium 126 (*)     Chloride 91 (*)     BUN/CREA Ratio 20.5 (*)     Calculated Osmolality 266 (*)     All other components within normal limits   POCT GLUCOSE - Abnormal; Notable for the following components:    POC Glucose  153 (*)     All other components within normal limits   TROPONIN I HIGH SENSITIVITY - Normal   LIPASE - Normal   RAINBOW DRAW BLUE     EKG    Rate, intervals and axes as noted on EKG Report.  Rate: 76  Rhythm: Sinus Rhythm  Reading: No STEMI, low voltage QRS, no ectopy              CT ABDOMEN+PELVIS(CONTRAST ONLY)(CPT=74177)  Result Date: 6/10/2025  CONCLUSION:  1. A heterogeneous enhancing mass of the left upper quadrant appears to infiltrate into the upper pole of the left kidney. This has increased in size from prior, compatible with metastatic neuroendocrine tumor.  2. Nonspecific infiltration of the greater omentum anteriorly extending into the pelvis, which could reflect mesenteric metastatic disease.  3. Suggestion of increasing peritrochanteric fluid collection adjacent to the posterior margin of the right hip. This could reflect trochanteric bursitis or other pathology.  4. Postoperative changes of distal pancreatectomy/splenectomy.  5. Apparent gastric wall thickening may be accentuated by underdistention or indicative underlying gastritis.  6. The extensive uncomplicated distal colonic diverticulosis.  7. Status post cholecystectomy without significant hepatobiliary dilatation.  8. Lesser incidental findings as above.    Dictated by (CST): Hans Orozco MD on 6/10/2025 at 1:01 PM     Finalized by (CST): Hans Orozco MD on 6/10/2025 at 1:16 PM          CT CHEST PE AORTA (IV ONLY) (CPT=71260)  Result Date: 6/10/2025  CONCLUSION:  1. No evidence of acute pulmonary embolism to the level of the first order subsegmental pulmonary artery branches.  2. Scattered atelectasis without further acute  intrathoracic process.  3. Pretracheal heterogeneously enhancing nodularity is seen, unchanged, and may reflect metastatic disease.  4. Ascending thoracic aortic dilatation. Continued surveillance is advised.  5. Dilatation of the main pulmonary artery trunk may relate to underlying pulmonary hypertension.  6. Lesser incidental findings as above.   Dictated by (CST): Hans Orozco MD on 6/10/2025 at 12:49 PM     Finalized by (CST): Hans Orozco MD on 6/10/2025 at 1:00 PM                            MDM   Reviewed PET scan report from 5/20/2025: There is enlargement of known metastases in the anterior mediastinal lymph node and left upper abdominal quadrants          Medical Decision Making  Vital signs are stable in the ED.  No neurologic deficits on my exam.  CBC with mild ongoing anemia, no leukocytosis or thrombocytopenia.  CMP with mild hyponatremia, normal renal function, normal LFTs.  Normal troponin.  Normal lipase.  Per my depend interpretation of CT abdomen pelvis, no free air or SBO.  Other radiology findings noted as above.  Discussed results of this workup with patient at bedside and explained that her pain is likely related to her metastatic disease.  Advise continued follow-up with her hematology oncologist, Zofran as needed, Norco as needed.  Return precautions given for severe worsening pain, intractable nausea or vomiting, other concerns and she is comfortable with the plan.    Problems Addressed:  Neuroendocrine cancer (HCC): chronic illness or injury with exacerbation, progression, or side effects of treatment  Upper abdominal pain: complicated acute illness or injury with systemic symptoms    Amount and/or Complexity of Data Reviewed  Labs: ordered. Decision-making details documented in ED Course.  Radiology: ordered and independent interpretation performed. Decision-making details documented in ED Course.  ECG/medicine tests: ordered and independent interpretation performed. Decision-making  details documented in ED Course.    Risk  Prescription drug management.  Parenteral controlled substances.  Decision regarding hospitalization.  Risk Details: [place]  reviewed        Disposition and Plan     Clinical Impression:  1. Upper abdominal pain    2. Neuroendocrine cancer (HCC)         Disposition:  Discharge  6/10/2025  1:32 pm    Follow-up:  Dr Leighton Gautam    Follow up      We recommend that you schedule follow up care with a primary care provider within the next three months to obtain basic health screening including reassessment of your blood pressure.      Medications Prescribed:  Current Discharge Medication List        START taking these medications    Details   ondansetron 4 MG Oral Tablet Dispersible Take 1 tablet (4 mg total) by mouth every 4 (four) hours as needed for Nausea.  Qty: 10 tablet, Refills: 0      HYDROcodone-acetaminophen 5-325 MG Oral Tab Take 1 tablet by mouth every 6 (six) hours as needed.  Qty: 10 tablet, Refills: 0    Associated Diagnoses: Neuroendocrine cancer (HCC)                   Supplementary Documentation:

## 2025-06-10 NOTE — ED QUICK NOTES
Pt informed of daughter in law Barb, calling for updates, pt gave permission to update her with ER visit and care.     Cab called by CM. Pt given option of waiting for son for possible  or take cab home, pt requested to take cab. Barb called by writing RN to update pt will be going home via cab. Pt left unit in stable condition.

## 2025-06-10 NOTE — CM/SW NOTE
Jenny renee arranged for patient with [facility], [phone].  RN confirmed patient's address and phone #.  Patient responsible for fees.

## 2025-06-11 LAB
ATRIAL RATE: 76 BPM
P AXIS: 49 DEGREES
P-R INTERVAL: 178 MS
Q-T INTERVAL: 414 MS
QRS DURATION: 80 MS
QTC CALCULATION (BEZET): 465 MS
R AXIS: 44 DEGREES
T AXIS: 60 DEGREES
VENTRICULAR RATE: 76 BPM

## 2025-06-24 ENCOUNTER — APPOINTMENT (OUTPATIENT)
Dept: ENDOCRINOLOGY | Age: 85
End: 2025-06-24

## 2025-07-22 ENCOUNTER — APPOINTMENT (OUTPATIENT)
Age: 85
End: 2025-07-22

## 2025-08-19 ENCOUNTER — APPOINTMENT (OUTPATIENT)
Age: 85
End: 2025-08-19

## 2025-08-28 ENCOUNTER — APPOINTMENT (OUTPATIENT)
Age: 85
End: 2025-08-28

## 2025-08-28 VITALS
SYSTOLIC BLOOD PRESSURE: 111 MMHG | BODY MASS INDEX: 24.81 KG/M2 | OXYGEN SATURATION: 97 % | TEMPERATURE: 98 F | DIASTOLIC BLOOD PRESSURE: 60 MMHG | WEIGHT: 144.51 LBS | HEART RATE: 68 BPM

## 2025-08-28 DIAGNOSIS — E11.649 HYPOGLYCEMIA ASSOCIATED WITH DIABETES  (CMD): ICD-10-CM

## 2025-08-28 DIAGNOSIS — E11.69 DIABETES MELLITUS ASSOCIATED WITH PANCREATIC DISEASE  (CMD): Primary | ICD-10-CM

## 2025-08-28 DIAGNOSIS — Z90.410 HISTORY OF PANCREATECTOMY: ICD-10-CM

## 2025-08-28 DIAGNOSIS — D3A.8 NEUROENDOCRINE TUMOR OF PANCREAS (CMD): ICD-10-CM

## 2025-08-28 DIAGNOSIS — K86.9 DIABETES MELLITUS ASSOCIATED WITH PANCREATIC DISEASE  (CMD): Primary | ICD-10-CM

## 2025-08-28 LAB — HBA1C MFR BLD: 7.7 % (ref 4.5–5.6)

## 2025-08-28 RX ORDER — DAPAGLIFLOZIN 10 MG/1
10 TABLET, FILM COATED ORAL DAILY
Qty: 90 TABLET | Refills: 1 | Status: SHIPPED | OUTPATIENT
Start: 2025-08-28

## 2025-08-28 RX ORDER — PIOGLITAZONE 30 MG/1
30 TABLET ORAL
Qty: 90 TABLET | Refills: 1 | Status: SHIPPED | OUTPATIENT
Start: 2025-08-28

## 2025-08-28 RX ORDER — SITAGLIPTIN AND METFORMIN HYDROCHLORIDE 1000; 50 MG/1; MG/1
1 TABLET, FILM COATED ORAL 2 TIMES DAILY WITH MEALS
Qty: 180 TABLET | Refills: 1 | Status: SHIPPED | OUTPATIENT
Start: 2025-08-28

## 2025-08-28 RX ORDER — GLIPIZIDE 10 MG/1
10 TABLET ORAL
Qty: 180 TABLET | Refills: 1 | Status: SHIPPED | OUTPATIENT
Start: 2025-08-28